# Patient Record
Sex: FEMALE | Race: BLACK OR AFRICAN AMERICAN | NOT HISPANIC OR LATINO | Employment: FULL TIME | URBAN - METROPOLITAN AREA
[De-identification: names, ages, dates, MRNs, and addresses within clinical notes are randomized per-mention and may not be internally consistent; named-entity substitution may affect disease eponyms.]

---

## 2023-06-09 DIAGNOSIS — I10 HYPERTENSION, UNSPECIFIED TYPE: ICD-10-CM

## 2023-06-09 RX ORDER — VALSARTAN AND HYDROCHLOROTHIAZIDE 160; 12.5 MG/1; MG/1
1 TABLET, FILM COATED ORAL DAILY
Qty: 90 TABLET | Refills: 0 | Status: SHIPPED | OUTPATIENT
Start: 2023-06-09 | End: 2024-03-20 | Stop reason: SDUPTHER

## 2023-06-09 RX ORDER — VALSARTAN AND HYDROCHLOROTHIAZIDE 160; 12.5 MG/1; MG/1
TABLET, FILM COATED ORAL
COMMUNITY
End: 2023-06-09 | Stop reason: SDUPTHER

## 2023-07-11 PROBLEM — R79.9 ABNORMAL BLOOD FINDINGS: Status: ACTIVE | Noted: 2023-07-11

## 2023-07-11 PROBLEM — L65.9 HAIR LOSS: Status: ACTIVE | Noted: 2023-07-11

## 2023-07-11 PROBLEM — I10 BENIGN HYPERTENSION: Status: ACTIVE | Noted: 2023-07-11

## 2023-07-11 PROBLEM — L71.9 ROSACEA: Status: ACTIVE | Noted: 2023-07-11

## 2023-07-11 PROBLEM — N62 BREAST HYPERTROPHY: Status: ACTIVE | Noted: 2023-07-11

## 2023-07-11 PROBLEM — M77.11 LATERAL EPICONDYLITIS OF RIGHT ELBOW: Status: ACTIVE | Noted: 2023-07-11

## 2023-07-11 PROBLEM — E53.8 VITAMIN B 12 DEFICIENCY: Status: ACTIVE | Noted: 2023-07-11

## 2023-07-11 PROBLEM — I88.9 LYMPHADENITIS: Status: ACTIVE | Noted: 2023-07-11

## 2023-07-11 PROBLEM — L70.9 ACNE: Status: ACTIVE | Noted: 2023-07-11

## 2023-07-11 PROBLEM — E55.9 VITAMIN D DEFICIENCY DISEASE: Status: ACTIVE | Noted: 2023-07-11

## 2023-07-11 PROBLEM — K21.9 ESOPHAGEAL REFLUX: Status: ACTIVE | Noted: 2023-07-11

## 2023-07-11 PROBLEM — E66.3 OVERWEIGHT: Status: ACTIVE | Noted: 2023-07-11

## 2023-07-11 RX ORDER — CLINDAMYCIN PHOSPHATE 10 UG/ML
LOTION TOPICAL
COMMUNITY
Start: 2021-04-28

## 2023-07-11 RX ORDER — ERGOCALCIFEROL 1.25 1/1
CAPSULE ORAL
COMMUNITY
End: 2023-08-18 | Stop reason: SDUPTHER

## 2023-07-11 RX ORDER — IBUPROFEN 800 MG/1
TABLET ORAL
COMMUNITY
Start: 2023-01-16

## 2023-07-11 RX ORDER — TRETINOIN 1 MG/G
CREAM TOPICAL
COMMUNITY
Start: 2022-03-31

## 2023-07-11 RX ORDER — MAGNESIUM 200 MG
TABLET ORAL
COMMUNITY
Start: 2021-09-16 | End: 2023-08-18 | Stop reason: SDUPTHER

## 2023-08-18 ENCOUNTER — TELEPHONE (OUTPATIENT)
Dept: PRIMARY CARE | Facility: CLINIC | Age: 48
End: 2023-08-18

## 2023-08-18 ENCOUNTER — OFFICE VISIT (OUTPATIENT)
Dept: PRIMARY CARE | Facility: CLINIC | Age: 48
End: 2023-08-18
Payer: COMMERCIAL

## 2023-08-18 VITALS
HEART RATE: 104 BPM | DIASTOLIC BLOOD PRESSURE: 67 MMHG | HEIGHT: 65 IN | BODY MASS INDEX: 26.66 KG/M2 | SYSTOLIC BLOOD PRESSURE: 133 MMHG | WEIGHT: 160 LBS

## 2023-08-18 DIAGNOSIS — Z12.11 COLON CANCER SCREENING: ICD-10-CM

## 2023-08-18 DIAGNOSIS — E53.8 VITAMIN B12 DEFICIENCY: ICD-10-CM

## 2023-08-18 DIAGNOSIS — E55.9 VITAMIN D DEFICIENCY DISEASE: ICD-10-CM

## 2023-08-18 DIAGNOSIS — M25.532 LEFT WRIST PAIN: Primary | ICD-10-CM

## 2023-08-18 DIAGNOSIS — E55.9 VITAMIN D DEFICIENCY: ICD-10-CM

## 2023-08-18 DIAGNOSIS — L65.9 HAIR LOSS: ICD-10-CM

## 2023-08-18 DIAGNOSIS — E53.8 VITAMIN B 12 DEFICIENCY: ICD-10-CM

## 2023-08-18 DIAGNOSIS — I10 BENIGN HYPERTENSION: ICD-10-CM

## 2023-08-18 DIAGNOSIS — R79.9 ABNORMAL BLOOD FINDINGS: ICD-10-CM

## 2023-08-18 LAB
POC HEMOGLOBIN A1C: 5.7 % (ref 4.2–6.5)
THYROTROPIN (MIU/L) IN SER/PLAS BY DETECTION LIMIT <= 0.05 MIU/L: <0.01 MIU/L (ref 0.44–3.98)
THYROXINE (T4) FREE (NG/DL) IN SER/PLAS: 1.77 NG/DL (ref 0.78–1.48)

## 2023-08-18 PROCEDURE — 83036 HEMOGLOBIN GLYCOSYLATED A1C: CPT | Performed by: FAMILY MEDICINE

## 2023-08-18 PROCEDURE — 1036F TOBACCO NON-USER: CPT | Performed by: FAMILY MEDICINE

## 2023-08-18 PROCEDURE — 3078F DIAST BP <80 MM HG: CPT | Performed by: FAMILY MEDICINE

## 2023-08-18 PROCEDURE — 84443 ASSAY THYROID STIM HORMONE: CPT

## 2023-08-18 PROCEDURE — 3075F SYST BP GE 130 - 139MM HG: CPT | Performed by: FAMILY MEDICINE

## 2023-08-18 PROCEDURE — 99396 PREV VISIT EST AGE 40-64: CPT | Performed by: FAMILY MEDICINE

## 2023-08-18 PROCEDURE — 84439 ASSAY OF FREE THYROXINE: CPT

## 2023-08-18 RX ORDER — MAGNESIUM 200 MG
1 TABLET ORAL DAILY
Qty: 90 TABLET | Refills: 1 | Status: SHIPPED | OUTPATIENT
Start: 2023-08-18 | End: 2023-10-30 | Stop reason: SDUPTHER

## 2023-08-18 RX ORDER — DICLOFENAC SODIUM 10 MG/G
4 GEL TOPICAL 4 TIMES DAILY
Qty: 100 G | Refills: 1 | Status: SHIPPED | OUTPATIENT
Start: 2023-08-18 | End: 2023-10-30 | Stop reason: ALTCHOICE

## 2023-08-18 RX ORDER — ERGOCALCIFEROL 1.25 1/1
CAPSULE ORAL
Qty: 13 CAPSULE | Refills: 1 | Status: SHIPPED | OUTPATIENT
Start: 2023-08-18 | End: 2023-10-30 | Stop reason: ALTCHOICE

## 2023-08-18 ASSESSMENT — ENCOUNTER SYMPTOMS
LOSS OF SENSATION IN FEET: 0
OCCASIONAL FEELINGS OF UNSTEADINESS: 0
DEPRESSION: 0

## 2023-08-18 ASSESSMENT — PATIENT HEALTH QUESTIONNAIRE - PHQ9
1. LITTLE INTEREST OR PLEASURE IN DOING THINGS: NOT AT ALL
2. FEELING DOWN, DEPRESSED OR HOPELESS: NOT AT ALL
SUM OF ALL RESPONSES TO PHQ9 QUESTIONS 1 AND 2: 0

## 2023-08-18 NOTE — PROGRESS NOTES
"  Subjective     Patient ID: Ely Melgar is a 47 y.o. female who presents for Follow-up (3 month A1c check ) and Wrist Pain.      Last Physical :  1 Years ago     Pt's PMH, PSH, SH, FH , meds and allergies was obtained / reviewed and updated .     Dental visits : Y  Vision issues : N  Hearing issues : N    Immunizations : Y    Diet :  could be better  Exercise:  Weight concerns : Y     Alcohol: as noted in SH  Tobacco: as noted in SH  Recreational drug use : None/ as noted in SH    Sexually active : Active   Contraception :   Menstrual problems:  Premenopausal/perimenopausal/ postmenopausal:    G:  Parity:  Full term:    Premature:   (s):   Living :  Ab induced:   Ab spontaneous :  Ectopic :   Multiple :    PAP smear : Due now/  Mammogram :UTD  Colonoscopy:    Metabolic screening   - Lipid   - Glucose    Patient has had pain at the base of the left thumb has noted swelling  ======================================================    Visit Vitals  Blood Pressure 133/67   Pulse 104   Height 1.651 m (5' 5\")   Weight 72.6 kg (160 lb)   Body Mass Index 26.63 kg/m²   Smoking Status Never   Body Surface Area 1.82 m²      No LMP recorded.     =====================================    Review of systems:  Constitutional: no chills, no fever and no night sweats.     Eyes: no blurred vision and no eyesight problems.     ENT: no hearing loss, no nasal congestion, no nasal discharge, no hoarseness and no sore throat.     Cardiovascular: no chest pain, no intermittent leg claudication, no lower extremity edema, no palpitations and no syncope.     Respiratory: no cough, no shortness of breath during exertion, no shortness of breath at rest and no wheezing.     Gastrointestinal: no abdominal pain, no blood in stools, no constipation, no diarrhea, no melena, no nausea, no rectal pain and no vomiting.     Genitourinary: no dysuria, no change in urinary frequency, no urinary hesitancy, no feelings of urinary urgency and no " vaginal discharge.     Musculoskeletal: no arthralgias, no back pain and no myalgias.     Integumentary: no new skin lesions and no rashes.     Neurological: no difficulty walking, no headache, no limb weakness, no numbness and no tingling.     Psychiatric: no anxiety, no depression, no anhedonia and no substance use disorders.   ============================================================    Physical exam :    Constitutional: Alert and in no acute distress. Well developed, well nourished.     Eyes: Normal external exam. Pupils were equal in size, round, reactive to light (PERRL) with normal accommodation and extraocular movements intact (EOMI).     Ears, Nose, Mouth, and Throat: External inspection of ears and nose: Normal.  Otoscopic examination: Normal.      Neck: No neck mass was observed. Supple.     Cardiovascular: Heart rate and rhythm were normal, normal S1 and S2, no gallops, no murmurs and no pericardial rub    Pulmonary: No respiratory distress. Clear bilateral breath sounds.     Abdomen: Soft nontender; no abdominal mass palpated. No organomegaly.     Musculoskeletal: No joint swelling seen, normal movements of all extremities. Range of motion: Normal.  Muscle strength/tone: Normal.      Skin: Normal skin color and pigmentation, normal skin turgor, and no rash.     Neurologic: Deep tendon reflexes were 2+ and symmetric. Sensation: Normal.     Psychiatric: Judgment and insight: Intact. Mood and affect: Normal.    Lymphatic : Cervical/ axillary/ groin Lns Palpable/ non palpable     Endocrine: no recent weight gain and no recent weight loss.     Hematologic/Lymphatic: no tendency for easy bruising and no swollen glands.          All other systems have been reviewed and are negative for complaint.      Assessment/Plan    Problem List Items Addressed This Visit       Abnormal blood findings    Relevant Orders    POCT glycosylated hemoglobin (Hb A1C) manually resulted (Completed)    Benign hypertension      Continue current medication         Hair loss    Relevant Orders    Thyroxine, Free    Thyroid Stimulating Hormone    Vitamin B 12 deficiency    Vitamin D deficiency disease    Colon cancer screening    Relevant Orders    Colonoscopy    Left wrist pain - Primary    Relevant Medications    diclofenac sodium (Voltaren) 1 % gel gel    Other Relevant Orders    XR wrist left 3+ views    Vitamin D deficiency    Relevant Medications    Vitamin D2 1,250 mcg (50,000 unit) capsule    Vitamin B12 deficiency    Relevant Medications    cyanocobalamin, vitamin B-12, 1,000 mcg tablet, sublingual   Follow up soon for PAP  Patient was referred for colonoscopy   Will get x-ray of the wrist

## 2023-09-08 ENCOUNTER — APPOINTMENT (OUTPATIENT)
Dept: PRIMARY CARE | Facility: CLINIC | Age: 48
End: 2023-09-08
Payer: COMMERCIAL

## 2023-09-25 ENCOUNTER — PROCEDURE VISIT (OUTPATIENT)
Dept: PRIMARY CARE | Facility: CLINIC | Age: 48
End: 2023-09-25
Payer: COMMERCIAL

## 2023-09-25 VITALS
BODY MASS INDEX: 26.52 KG/M2 | DIASTOLIC BLOOD PRESSURE: 91 MMHG | SYSTOLIC BLOOD PRESSURE: 133 MMHG | HEART RATE: 108 BPM | HEIGHT: 66 IN | WEIGHT: 165 LBS

## 2023-09-25 DIAGNOSIS — I10 BENIGN HYPERTENSION: ICD-10-CM

## 2023-09-25 DIAGNOSIS — Z12.4 CERVICAL CANCER SCREENING: Primary | ICD-10-CM

## 2023-09-25 DIAGNOSIS — E05.90 HYPERTHYROIDISM: ICD-10-CM

## 2023-09-25 PROCEDURE — 84439 ASSAY OF FREE THYROXINE: CPT

## 2023-09-25 PROCEDURE — 99214 OFFICE O/P EST MOD 30 MIN: CPT | Performed by: FAMILY MEDICINE

## 2023-09-25 PROCEDURE — 88175 CYTOPATH C/V AUTO FLUID REDO: CPT

## 2023-09-25 PROCEDURE — 87624 HPV HI-RISK TYP POOLED RSLT: CPT

## 2023-09-25 ASSESSMENT — ENCOUNTER SYMPTOMS
LOSS OF SENSATION IN FEET: 0
DEPRESSION: 0
OCCASIONAL FEELINGS OF UNSTEADINESS: 0

## 2023-09-25 ASSESSMENT — PATIENT HEALTH QUESTIONNAIRE - PHQ9
1. LITTLE INTEREST OR PLEASURE IN DOING THINGS: NOT AT ALL
SUM OF ALL RESPONSES TO PHQ9 QUESTIONS 1 AND 2: 0
2. FEELING DOWN, DEPRESSED OR HOPELESS: NOT AT ALL

## 2023-09-26 LAB — THYROXINE (T4) FREE (NG/DL) IN SER/PLAS: 1.28 NG/DL (ref 0.78–1.48)

## 2023-09-27 ENCOUNTER — LAB (OUTPATIENT)
Dept: LAB | Facility: LAB | Age: 48
End: 2023-09-27
Payer: COMMERCIAL

## 2023-09-27 DIAGNOSIS — E05.90 OVERACTIVE THYROID GLAND: ICD-10-CM

## 2023-09-27 LAB — THYROTROPIN (MIU/L) IN SER/PLAS BY DETECTION LIMIT <= 0.05 MIU/L: <0.01 MIU/L (ref 0.44–3.98)

## 2023-09-27 PROCEDURE — 36415 COLL VENOUS BLD VENIPUNCTURE: CPT

## 2023-09-27 PROCEDURE — 83519 RIA NONANTIBODY: CPT

## 2023-09-27 PROCEDURE — 84443 ASSAY THYROID STIM HORMONE: CPT

## 2023-09-27 PROCEDURE — 84445 ASSAY OF TSI GLOBULIN: CPT

## 2023-09-29 LAB — TSH RECEPTOR ANTIBODY: 11.9 IU/L

## 2023-10-01 PROBLEM — E05.90 HYPERTHYROIDISM: Status: ACTIVE | Noted: 2023-10-01

## 2023-10-01 PROBLEM — Z12.4 CERVICAL CANCER SCREENING: Status: ACTIVE | Noted: 2023-10-01

## 2023-10-01 ASSESSMENT — ENCOUNTER SYMPTOMS
NAUSEA: 0
APPETITE CHANGE: 1
HEMATOLOGIC/LYMPHATIC NEGATIVE: 1
UNEXPECTED WEIGHT CHANGE: 1
ALLERGIC/IMMUNOLOGIC NEGATIVE: 1
CHILLS: 0
ENDOCRINE NEGATIVE: 1
PSYCHIATRIC NEGATIVE: 1
GASTROINTESTINAL NEGATIVE: 1
VOMITING: 0
MUSCULOSKELETAL NEGATIVE: 1
CARDIOVASCULAR NEGATIVE: 1
FEVER: 0
NEUROLOGICAL NEGATIVE: 1
RESPIRATORY NEGATIVE: 1

## 2023-10-02 DIAGNOSIS — E05.90 HYPERTHYROIDISM: ICD-10-CM

## 2023-10-02 RX ORDER — METHIMAZOLE 5 MG/1
5 TABLET ORAL 2 TIMES DAILY
Qty: 180 TABLET | Refills: 1 | Status: SHIPPED | OUTPATIENT
Start: 2023-10-02 | End: 2023-10-27 | Stop reason: SDUPTHER

## 2023-10-02 NOTE — PROGRESS NOTES
Subjective   Patient ID: Ely Melgar is a 48 y.o. female who presents for Follow-up and Gynecologic Exam.      Gynecologic Exam  Pertinent negatives include no chills, fever, nausea, rash or vomiting.    hypothyroidism TSH was low T4 was high was advised to return to check antibodies but is advised to start methimazole  Current Outpatient Medications on File Prior to Visit   Medication Sig Dispense Refill    clindamycin (Cleocin T) 1 % lotion APPLY SPARINGLY TO AFFECTED AREA(S) ONCE DAILY      cyanocobalamin, vitamin B-12, 1,000 mcg tablet, sublingual Take 1 tablet (1,000 mcg) by mouth once daily. 90 tablet 1    valsartan-hydrochlorothiazide (Diovan-HCT) 160-12.5 mg tablet Take 1 tablet by mouth once daily. 90 tablet 0    Vitamin D2 1,250 mcg (50,000 unit) capsule TAKE ONE CAPSULE BY MOUTH EVERY WEEK 13 capsule 1    diclofenac sodium (Voltaren) 1 % gel gel Apply 1 Application topically 4 times a day. (Patient not taking: Reported on 9/25/2023) 100 g 1     mg tablet TAKE ONE TABLET BY MOUTH EVERY 8 HOURS AS NEEDED FOR PAIN      methIMAzole (Tapazole) 5 mg tablet Take 1 tablet (5 mg) by mouth once daily. (Patient not taking: Reported on 9/25/2023) 90 tablet 1    tretinoin (Retin-A) 0.1 % cream Apply sparingly to affected area once daily at bedtime       No current facility-administered medications on file prior to visit.        Review of Systems   Constitutional:  Positive for appetite change and unexpected weight change. Negative for chills and fever.   HENT: Negative.     Respiratory: Negative.     Cardiovascular: Negative.    Gastrointestinal: Negative.  Negative for nausea and vomiting.   Endocrine: Negative.    Genitourinary: Negative.    Musculoskeletal: Negative.    Skin: Negative.  Negative for rash.   Allergic/Immunologic: Negative.    Neurological: Negative.    Hematological: Negative.    Psychiatric/Behavioral: Negative.     All other systems reviewed and are negative.      Objective   Blood  "Pressure (Abnormal) 133/91   Pulse 108   Height 1.676 m (5' 6\")   Weight 74.8 kg (165 lb)   Body Mass Index 26.63 kg/m²   BSA: 1.87 meters squared  Growth percentiles: Facility age limit for growth %jami is 20 years. Facility age limit for growth %jami is 20 years.   Procedure Visit on 09/25/2023   Component Date Value Ref Range Status    Free T4 09/25/2023 1.28  0.78 - 1.48 ng/dL Final     Thyroxine Free testing is performed using different testing    methodology at [facility] than at other    [facility]. Direct result comparisons should    only be made within the same method.      Physical Exam  Constitutional:       General: She is not in acute distress.  HENT:      Head: Normocephalic.   Eyes:      Extraocular Movements: Extraocular movements intact.   Cardiovascular:      Rate and Rhythm: Normal rate and regular rhythm.   Pulmonary:      Breath sounds: Normal breath sounds.   Abdominal:      General: Bowel sounds are normal.   Musculoskeletal:         General: Normal range of motion.      Cervical back: No rigidity.   Skin:     Findings: No rash.   Neurological:      General: No focal deficit present.      Mental Status: She is alert.   Psychiatric:         Thought Content: Thought content normal.         Assessment/Plan   Problem List Items Addressed This Visit             ICD-10-CM    Benign hypertension I10     Continue current medication         Cervical cancer screening - Primary Z12.4    Relevant Orders    THINPREP PAP TEST    Hyperthyroidism E05.90     We will check labs patient was advised to restart methimazole         Relevant Orders    Thyroxine, Free (Completed)              "

## 2023-10-03 LAB
COMPLETE PATHOLOGY REPORT: NORMAL
CONVERTED CLINICAL DIAGNOSIS-HISTORY: NORMAL
CONVERTED DIAGNOSIS COMMENT: NORMAL
CONVERTED FINAL DIAGNOSIS: NORMAL
CONVERTED FINAL REPORT PDF LINK TO COPY AND PASTE: NORMAL

## 2023-10-04 ENCOUNTER — TELEPHONE (OUTPATIENT)
Dept: PRIMARY CARE | Facility: CLINIC | Age: 48
End: 2023-10-04
Payer: COMMERCIAL

## 2023-10-09 ENCOUNTER — OFFICE VISIT (OUTPATIENT)
Dept: SURGERY | Facility: CLINIC | Age: 48
End: 2023-10-09
Payer: COMMERCIAL

## 2023-10-09 VITALS
HEART RATE: 92 BPM | SYSTOLIC BLOOD PRESSURE: 131 MMHG | WEIGHT: 166 LBS | BODY MASS INDEX: 26.79 KG/M2 | DIASTOLIC BLOOD PRESSURE: 52 MMHG

## 2023-10-09 DIAGNOSIS — E04.2 MULTINODULAR THYROID: Primary | ICD-10-CM

## 2023-10-09 DIAGNOSIS — E05.00 GRAVES DISEASE: ICD-10-CM

## 2023-10-09 PROCEDURE — 3075F SYST BP GE 130 - 139MM HG: CPT | Performed by: SURGERY

## 2023-10-09 PROCEDURE — 88112 CYTOPATH CELL ENHANCE TECH: CPT

## 2023-10-09 PROCEDURE — 99202 OFFICE O/P NEW SF 15 MIN: CPT | Performed by: SURGERY

## 2023-10-09 PROCEDURE — 60100 BIOPSY OF THYROID: CPT | Performed by: SURGERY

## 2023-10-09 PROCEDURE — 88173 CYTOPATH EVAL FNA REPORT: CPT

## 2023-10-09 PROCEDURE — 88173 CYTOPATH EVAL FNA REPORT: CPT | Performed by: PATHOLOGY

## 2023-10-09 PROCEDURE — 3078F DIAST BP <80 MM HG: CPT | Performed by: SURGERY

## 2023-10-09 PROCEDURE — 1036F TOBACCO NON-USER: CPT | Performed by: SURGERY

## 2023-10-09 ASSESSMENT — ENCOUNTER SYMPTOMS
GASTROINTESTINAL NEGATIVE: 1
MUSCULOSKELETAL NEGATIVE: 1
PSYCHIATRIC NEGATIVE: 1
HEMATOLOGIC/LYMPHATIC NEGATIVE: 1
EYES NEGATIVE: 1
RESPIRATORY NEGATIVE: 1
ENDOCRINE NEGATIVE: 1
NEUROLOGICAL NEGATIVE: 1
ALLERGIC/IMMUNOLOGIC NEGATIVE: 1
PALPITATIONS: 1
FATIGUE: 1

## 2023-10-09 NOTE — PROGRESS NOTES
Subjective   Patient ID: Ely eMlgar is a 48 y.o. female who presents for No chief complaint on file..  HPI I saw Mrs. Melgar in surgery clinic today.  She was referred by her primary care physician for evaluation of a multinodular thyroid gland in the background setting of Graves' disease.  She reports that she was diagnosed with Graves' disease about a month or so ago.  She is currently taking methimazole.  At the time of her diagnosis her TSH was completely suppressed at less than 0.01.  Her free T4 was high at 1.77.  Her thyrotropin receptor antibody was high at 11.9 giving her diagnosis of Graves'.    She denies any neck pain.  No real difficulty with breathing or swallowing.  She does occasionally get a globus sensation in her throat.  No hoarseness.  No personal history of head neck or chest radiation exposure.    Family history she said some in the family had some sort of thyroid disorder.  Unclear what.  No history of thyroid cancer in the family.    Review of Systems   Constitutional:  Positive for fatigue.   HENT: Negative.     Eyes: Negative.    Respiratory: Negative.     Cardiovascular:  Positive for palpitations.   Gastrointestinal: Negative.    Endocrine: Negative.    Musculoskeletal: Negative.    Skin: Negative.    Allergic/Immunologic: Negative.    Neurological: Negative.    Hematological: Negative.    Psychiatric/Behavioral: Negative.         Objective   Physical Exam  Constitutional:       Appearance: Normal appearance.   Eyes:      Pupils: Pupils are equal, round, and reactive to light.      Comments: No proptosis   Neck:      Vascular: No carotid bruit.      Comments: Patient has a palpable multinodular thyroid gland with right greater than left-sided nodules.  Her trachea is midline.  Nodules move easily upon swallowing.  No localized fixation.  Cardiovascular:      Pulses: Normal pulses.      Heart sounds: Normal heart sounds.   Pulmonary:      Effort: Pulmonary effort is normal.       Breath sounds: Normal breath sounds.   Abdominal:      General: Abdomen is flat.   Musculoskeletal:         General: Normal range of motion.      Cervical back: No rigidity.   Skin:     General: Skin is warm.      Coloration: Skin is not jaundiced.      Findings: No lesion.   Neurological:      General: No focal deficit present.      Mental Status: She is alert and oriented to person, place, and time.   Psychiatric:         Mood and Affect: Mood normal.         Behavior: Behavior normal.     Lab Results   Component Value Date    TSH <0.01 (L) 09/27/2023    FREET4 1.28 09/25/2023       Patient ID: Ely Melgar is a 48 y.o. female.    General    Date/Time: 10/9/2023 10:47 AM    Performed by: Kaleb Cade MD  Authorized by: Kaleb Cade MD    Consent:     Consent obtained:  Verbal    Consent given by:  Patient    Risks, benefits, and alternatives were discussed: yes      Risks discussed:  Bleeding and infection    Alternatives discussed:  No treatment  Universal protocol:     Procedure explained and questions answered to patient or proxy's satisfaction: yes      Relevant documents present and verified: yes      Test results available: yes      Imaging studies available: yes      Required blood products, implants, devices, and special equipment available: no      Site/side marked: no      Immediately prior to procedure, a time out was called: yes      Patient identity confirmed:  Verbally with patient  Indications:     Indications:  Thyroid nodule  Pre-procedure details:     Skin preparation:  Povidone-iodine  Sedation:     Sedation type:  None (local anesthesia lidocaine1%)  Anesthesia:     Anesthesia method:  Local infiltration    Local anesthetic:  Lidocaine 1% w/o epi  Procedure specific details:      In the office I did an ultrasound-guided biopsy of the patient's 2 cm right-sided TI-RADS 4 nodule.  This was done under real-time ultrasound guidance at the 6-15 MHz linear ultrasound probe.  2 samples  were taken.  Images were captured.  Post-procedure details:     Procedure completion:  Tolerated        Assessment/Plan     Mrs. Melgar has a new diagnosis of Graves' disease.  She is currently on methimazole under the care of her primary care physician for treatment of that.  I will leave that to her PCPs discretion and adjustment of medications.    However patient also had an ultrasound performed in August showing multinodular thyroid gland.  There was a dominant 2 cm nodule in the right thyroid lobe that was TI-RADS 4.  Based on this characteristic I told her I would recommend a biopsy of the lesion.  There was a second lesion at 1.8 cm.  On my personal review that looks like 2 separate nodules.  Therefore do not think either nodule would meet criteria for biopsy out of that conglomerate.    Today in the office I did an ultrasound-guided biopsy of the lesion on the right side that was 2 cm.  She tolerated this well with no complications.    Plan    1.  I told her I will call her toward the end of the week with biopsy reports.  If she has not heard back from me within 1 week, she can feel free to contact my office.  Any further decision for surgical excision versus ongoing radiographic evaluation will be based on biopsy reports.

## 2023-10-10 ENCOUNTER — APPOINTMENT (OUTPATIENT)
Dept: GASTROENTEROLOGY | Facility: HOSPITAL | Age: 48
End: 2023-10-10
Payer: COMMERCIAL

## 2023-10-10 DIAGNOSIS — E05.90 HYPERTHYROIDISM: ICD-10-CM

## 2023-10-10 LAB
LABORATORY COMMENT REPORT: NORMAL
LABORATORY COMMENT REPORT: NORMAL
PATH REPORT.FINAL DX SPEC: NORMAL
PATH REPORT.GROSS SPEC: NORMAL
PATH REPORT.RELEVANT HX SPEC: NORMAL
PATH REPORT.TOTAL CANCER: NORMAL
THYROID STIMULATING IMMUNOGLOBULIN: 3.9 TSI INDEX

## 2023-10-16 ENCOUNTER — DOCUMENTATION (OUTPATIENT)
Dept: SURGERY | Facility: CLINIC | Age: 48
End: 2023-10-16
Payer: COMMERCIAL

## 2023-10-16 NOTE — PROGRESS NOTES
Sent patient thyroid ultrasound order requisition (due 10/2024) via email with instructions to schedule.

## 2023-10-27 DIAGNOSIS — E05.90 HYPERTHYROIDISM: ICD-10-CM

## 2023-10-27 RX ORDER — METHIMAZOLE 5 MG/1
5 TABLET ORAL 2 TIMES DAILY
Qty: 180 TABLET | Refills: 0 | Status: SHIPPED | OUTPATIENT
Start: 2023-10-27 | End: 2023-10-31 | Stop reason: ALTCHOICE

## 2023-10-30 ENCOUNTER — OFFICE VISIT (OUTPATIENT)
Dept: PRIMARY CARE | Facility: CLINIC | Age: 48
End: 2023-10-30
Payer: COMMERCIAL

## 2023-10-30 VITALS
HEIGHT: 65 IN | SYSTOLIC BLOOD PRESSURE: 127 MMHG | WEIGHT: 156 LBS | DIASTOLIC BLOOD PRESSURE: 85 MMHG | BODY MASS INDEX: 25.99 KG/M2

## 2023-10-30 DIAGNOSIS — E53.8 VITAMIN B12 DEFICIENCY: ICD-10-CM

## 2023-10-30 DIAGNOSIS — E55.9 VITAMIN D DEFICIENCY: ICD-10-CM

## 2023-10-30 DIAGNOSIS — E05.90 HYPERTHYROIDISM: ICD-10-CM

## 2023-10-30 DIAGNOSIS — E05.00 GRAVES DISEASE: Primary | ICD-10-CM

## 2023-10-30 LAB
ALBUMIN SERPL BCP-MCNC: 4.1 G/DL (ref 3.4–5)
ALP SERPL-CCNC: 152 U/L (ref 33–110)
ALT SERPL W P-5'-P-CCNC: 24 U/L (ref 7–45)
ANION GAP SERPL CALC-SCNC: 15 MMOL/L (ref 10–20)
AST SERPL W P-5'-P-CCNC: 22 U/L (ref 9–39)
BILIRUB SERPL-MCNC: 0.4 MG/DL (ref 0–1.2)
BUN SERPL-MCNC: 16 MG/DL (ref 6–23)
CALCIUM SERPL-MCNC: 9.4 MG/DL (ref 8.6–10.6)
CHLORIDE SERPL-SCNC: 107 MMOL/L (ref 98–107)
CO2 SERPL-SCNC: 25 MMOL/L (ref 21–32)
CREAT SERPL-MCNC: 0.63 MG/DL (ref 0.5–1.05)
ERYTHROCYTE [DISTWIDTH] IN BLOOD BY AUTOMATED COUNT: 12.8 % (ref 11.5–14.5)
GFR SERPL CREATININE-BSD FRML MDRD: >90 ML/MIN/1.73M*2
GLUCOSE SERPL-MCNC: 77 MG/DL (ref 74–99)
HCT VFR BLD AUTO: 40.9 % (ref 36–46)
HGB BLD-MCNC: 13.3 G/DL (ref 12–16)
MCH RBC QN AUTO: 30.2 PG (ref 26–34)
MCHC RBC AUTO-ENTMCNC: 32.5 G/DL (ref 32–36)
MCV RBC AUTO: 93 FL (ref 80–100)
NRBC BLD-RTO: 0 /100 WBCS (ref 0–0)
PLATELET # BLD AUTO: 357 X10*3/UL (ref 150–450)
PMV BLD AUTO: 9.2 FL (ref 7.5–11.5)
POTASSIUM SERPL-SCNC: 4.4 MMOL/L (ref 3.5–5.3)
PROT SERPL-MCNC: 7.1 G/DL (ref 6.4–8.2)
RBC # BLD AUTO: 4.4 X10*6/UL (ref 4–5.2)
SODIUM SERPL-SCNC: 143 MMOL/L (ref 136–145)
WBC # BLD AUTO: 4 X10*3/UL (ref 4.4–11.3)

## 2023-10-30 PROCEDURE — 36415 COLL VENOUS BLD VENIPUNCTURE: CPT

## 2023-10-30 PROCEDURE — 85027 COMPLETE CBC AUTOMATED: CPT

## 2023-10-30 PROCEDURE — 84439 ASSAY OF FREE THYROXINE: CPT

## 2023-10-30 PROCEDURE — 3079F DIAST BP 80-89 MM HG: CPT | Performed by: FAMILY MEDICINE

## 2023-10-30 PROCEDURE — 3074F SYST BP LT 130 MM HG: CPT | Performed by: FAMILY MEDICINE

## 2023-10-30 PROCEDURE — 99214 OFFICE O/P EST MOD 30 MIN: CPT | Performed by: FAMILY MEDICINE

## 2023-10-30 PROCEDURE — 84443 ASSAY THYROID STIM HORMONE: CPT

## 2023-10-30 PROCEDURE — 1036F TOBACCO NON-USER: CPT | Performed by: FAMILY MEDICINE

## 2023-10-30 PROCEDURE — 80053 COMPREHEN METABOLIC PANEL: CPT

## 2023-10-30 RX ORDER — MAGNESIUM 200 MG
1 TABLET ORAL DAILY
Qty: 90 TABLET | Refills: 1 | Status: SHIPPED | OUTPATIENT
Start: 2023-10-30

## 2023-10-30 RX ORDER — ERGOCALCIFEROL 1.25 MG/1
CAPSULE ORAL
Qty: 13 CAPSULE | Refills: 1 | Status: SHIPPED | OUTPATIENT
Start: 2023-10-30

## 2023-10-30 ASSESSMENT — ENCOUNTER SYMPTOMS
OCCASIONAL FEELINGS OF UNSTEADINESS: 0
DEPRESSION: 0
LOSS OF SENSATION IN FEET: 0

## 2023-10-30 ASSESSMENT — PATIENT HEALTH QUESTIONNAIRE - PHQ9
SUM OF ALL RESPONSES TO PHQ9 QUESTIONS 1 AND 2: 0
2. FEELING DOWN, DEPRESSED OR HOPELESS: NOT AT ALL
1. LITTLE INTEREST OR PLEASURE IN DOING THINGS: NOT AT ALL

## 2023-10-31 LAB
T4 FREE SERPL-MCNC: 0.98 NG/DL (ref 0.78–1.48)
TSH SERPL-ACNC: <0.01 MIU/L (ref 0.44–3.98)

## 2023-11-05 PROBLEM — E05.00 GRAVES DISEASE: Status: ACTIVE | Noted: 2023-11-05

## 2023-11-05 RX ORDER — METHIMAZOLE 10 MG/1
10 TABLET ORAL 2 TIMES DAILY
Qty: 60 TABLET | Refills: 2 | Status: SHIPPED | OUTPATIENT
Start: 2023-11-05 | End: 2024-03-12 | Stop reason: ALTCHOICE

## 2023-11-05 NOTE — PROGRESS NOTES
Chief Complaint/HPI:    Patient is here for follow-up for hyperparathyroidism hypertension blood pressure improved weight is unchanged endocrinology appointment not till the end of December    ROS otherwise negative aside from what was mentioned above in HPI.      Patient Active Problem List   Diagnosis    Abnormal blood findings    Acne    Benign hypertension    Breast hypertrophy    Esophageal reflux    Hair loss    Lateral epicondylitis of right elbow    Lymphadenitis    Overweight    Rosacea    Vitamin B 12 deficiency    Vitamin D deficiency disease    Colon cancer screening    Left wrist pain    Vitamin D deficiency    Vitamin B12 deficiency    Cervical cancer screening    Hyperthyroidism    Graves disease     Past Medical History:   Diagnosis Date    HTN (hypertension)     Thyroid nodule     Unspecified abdominal hernia without obstruction or gangrene     Hernia     No past surgical history on file.  Family History   Problem Relation Name Age of Onset    Hypertension Father       Social History     Tobacco Use    Smoking status: Never    Smokeless tobacco: Never   Substance Use Topics    Alcohol use: Yes     Comment: social drinker    Drug use: Never         ALLERGIES  No Known Allergies      MEDICATIONS  Current Outpatient Medications   Medication Sig Dispense Refill    clindamycin (Cleocin T) 1 % lotion APPLY SPARINGLY TO AFFECTED AREA(S) ONCE DAILY      cyanocobalamin, vitamin B-12, 1,000 mcg tablet, sublingual Take 1 tablet (1,000 mcg) by mouth once daily. 90 tablet 1    ergocalciferol (Vitamin D2) 1.25 MG (04424 UT) capsule TAKE ONE CAPSULE BY MOUTH EVERY WEEK 13 capsule 1     mg tablet TAKE ONE TABLET BY MOUTH EVERY 8 HOURS AS NEEDED FOR PAIN      methIMAzole (Tapazole) 10 mg tablet Take 1 tablet (10 mg) by mouth 2 times a day. 60 tablet 2    tretinoin (Retin-A) 0.1 % cream Apply sparingly to affected area once daily at bedtime      valsartan-hydrochlorothiazide (Diovan-HCT) 160-12.5 mg tablet  Take 1 tablet by mouth once daily. 90 tablet 0     No current facility-administered medications for this visit.         PHYSICAL EXAM  Visit Vitals  Blood Pressure 127/85     Body mass index is 25.96 kg/m².  Gen: Alert, NAD  HEENT:  PERRLA, EOMI, conjunctiva and sclera normal in appearance  Respiratory:  Lungs CTAB  Cardiovascular:  Heart RRR. No M/R/G  Neuro:  Gross motor and sensory intact  Skin:  No suspicious lesions present    ASSESSMENT/PLAN  Problem List Items Addressed This Visit       Vitamin D deficiency    Relevant Medications    ergocalciferol (Vitamin D2) 1.25 MG (38348 UT) capsule    Vitamin B12 deficiency    Relevant Medications    cyanocobalamin, vitamin B-12, 1,000 mcg tablet, sublingual    Hyperthyroidism    Relevant Medications    methIMAzole (Tapazole) 10 mg tablet    Other Relevant Orders    TSH with reflex to Free T4 if abnormal    Graves disease - Primary    Relevant Orders    Thyroxine, Free (Completed)    Thyroid Stimulating Hormone (Completed)    Comprehensive Metabolic Panel (Completed)    CBC (Completed)    TSH with reflex to Free T4 if abnormal           Chadwick Munroe MD

## 2023-12-22 ENCOUNTER — APPOINTMENT (OUTPATIENT)
Dept: ENDOCRINOLOGY | Facility: HOSPITAL | Age: 48
End: 2023-12-22
Payer: COMMERCIAL

## 2024-02-19 ENCOUNTER — TELEPHONE (OUTPATIENT)
Dept: ENDOCRINOLOGY | Facility: HOSPITAL | Age: 49
End: 2024-02-19

## 2024-02-19 ENCOUNTER — OFFICE VISIT (OUTPATIENT)
Dept: ENDOCRINOLOGY | Facility: HOSPITAL | Age: 49
End: 2024-02-19
Payer: COMMERCIAL

## 2024-02-19 ENCOUNTER — LAB (OUTPATIENT)
Dept: LAB | Facility: LAB | Age: 49
End: 2024-02-19
Payer: COMMERCIAL

## 2024-02-19 VITALS
HEIGHT: 66 IN | HEART RATE: 74 BPM | BODY MASS INDEX: 27.8 KG/M2 | TEMPERATURE: 98.1 F | SYSTOLIC BLOOD PRESSURE: 137 MMHG | WEIGHT: 173 LBS | OXYGEN SATURATION: 99 % | DIASTOLIC BLOOD PRESSURE: 92 MMHG

## 2024-02-19 DIAGNOSIS — E05.00 GRAVES DISEASE: ICD-10-CM

## 2024-02-19 DIAGNOSIS — E05.90 HYPERTHYROIDISM: ICD-10-CM

## 2024-02-19 DIAGNOSIS — E05.90 HYPERTHYROIDISM: Primary | ICD-10-CM

## 2024-02-19 LAB
ALBUMIN SERPL BCP-MCNC: 4.4 G/DL (ref 3.4–5)
ALP SERPL-CCNC: 124 U/L (ref 33–110)
ALT SERPL W P-5'-P-CCNC: 16 U/L (ref 7–45)
ANION GAP SERPL CALC-SCNC: 13 MMOL/L (ref 10–20)
AST SERPL W P-5'-P-CCNC: 20 U/L (ref 9–39)
BILIRUB SERPL-MCNC: 0.6 MG/DL (ref 0–1.2)
BUN SERPL-MCNC: 11 MG/DL (ref 6–23)
CALCIUM SERPL-MCNC: 9.5 MG/DL (ref 8.6–10.6)
CHLORIDE SERPL-SCNC: 104 MMOL/L (ref 98–107)
CO2 SERPL-SCNC: 27 MMOL/L (ref 21–32)
CREAT SERPL-MCNC: 0.93 MG/DL (ref 0.5–1.05)
EGFRCR SERPLBLD CKD-EPI 2021: 76 ML/MIN/1.73M*2
GLUCOSE SERPL-MCNC: 87 MG/DL (ref 74–99)
POTASSIUM SERPL-SCNC: 4 MMOL/L (ref 3.5–5.3)
PROT SERPL-MCNC: 7.2 G/DL (ref 6.4–8.2)
SODIUM SERPL-SCNC: 140 MMOL/L (ref 136–145)
T3 SERPL-MCNC: 45 NG/DL (ref 60–200)
T4 FREE SERPL-MCNC: 0.2 NG/DL (ref 0.78–1.48)
TSH SERPL-ACNC: 58.49 MIU/L (ref 0.44–3.98)

## 2024-02-19 PROCEDURE — 1036F TOBACCO NON-USER: CPT | Performed by: STUDENT IN AN ORGANIZED HEALTH CARE EDUCATION/TRAINING PROGRAM

## 2024-02-19 PROCEDURE — 3080F DIAST BP >= 90 MM HG: CPT | Performed by: STUDENT IN AN ORGANIZED HEALTH CARE EDUCATION/TRAINING PROGRAM

## 2024-02-19 PROCEDURE — 36415 COLL VENOUS BLD VENIPUNCTURE: CPT

## 2024-02-19 PROCEDURE — 84443 ASSAY THYROID STIM HORMONE: CPT

## 2024-02-19 PROCEDURE — 99204 OFFICE O/P NEW MOD 45 MIN: CPT | Performed by: STUDENT IN AN ORGANIZED HEALTH CARE EDUCATION/TRAINING PROGRAM

## 2024-02-19 PROCEDURE — 84439 ASSAY OF FREE THYROXINE: CPT

## 2024-02-19 PROCEDURE — 99214 OFFICE O/P EST MOD 30 MIN: CPT | Performed by: STUDENT IN AN ORGANIZED HEALTH CARE EDUCATION/TRAINING PROGRAM

## 2024-02-19 PROCEDURE — 84480 ASSAY TRIIODOTHYRONINE (T3): CPT

## 2024-02-19 PROCEDURE — 84445 ASSAY OF TSI GLOBULIN: CPT

## 2024-02-19 PROCEDURE — 3075F SYST BP GE 130 - 139MM HG: CPT | Performed by: STUDENT IN AN ORGANIZED HEALTH CARE EDUCATION/TRAINING PROGRAM

## 2024-02-19 PROCEDURE — 80053 COMPREHEN METABOLIC PANEL: CPT

## 2024-02-19 ASSESSMENT — PATIENT HEALTH QUESTIONNAIRE - PHQ9
2. FEELING DOWN, DEPRESSED OR HOPELESS: NOT AT ALL
1. LITTLE INTEREST OR PLEASURE IN DOING THINGS: NOT AT ALL
SUM OF ALL RESPONSES TO PHQ9 QUESTIONS 1 AND 2: 0

## 2024-02-19 ASSESSMENT — ENCOUNTER SYMPTOMS
OCCASIONAL FEELINGS OF UNSTEADINESS: 0
LOSS OF SENSATION IN FEET: 0
DEPRESSION: 0

## 2024-02-19 ASSESSMENT — PAIN SCALES - GENERAL: PAINLEVEL: 0-NO PAIN

## 2024-02-19 NOTE — LETTER
February 27, 2024     Chadwick Munroe MD  [address]  [facility] [address]  [facility] [address]    Patient: Ely Melgar   YOB: 1975   Date of Visit: 2/19/2024       Dear Dr. Chadwick Munroe MD:    Thank you for referring Ely Melgar to me for evaluation. Below are my notes for this consultation.  If you have questions, please do not hesitate to call me. I look forward to following your patient along with you.       Sincerely,     Radha Trammell MD      CC: No Recipients  ______________________________________________________________________________________    Subjective   Ely Melgar is a 48 y.o. female who I was asked to see in consultation for evaluation of hyperthyroidism.    Patient diagnosed with Graves diasease around August and was started on methimazole 10 mg twice daily  Initial blood work showed a TSH 0.03 in January 2023 with Ft4: 1.02  Repeated in August 2023 TSH < 0.01 and FT4: 1.77  US thyroid in August:   Right-side:   2 cm solid, hypoechoic nodule the right thyroid midpole compatible a TIRADS 4 nodule.  1.8 cm solid, hypoechoic nodule the right thyroid lower pole compatible a TIRADS 4 nodule.  0.8 cm solid, hypoechoic nodule the right thyroid upper pole compatible a TIRADS 4 nodule.     Left side:   2 separate 1 cm upper pole and lower pole solid, hypoechoic nodules compatible with TI-RADS 4 nodules each.  0.7 cm upper pole nodule which also appears solid and hypoechoic compatible a TIRADS 4 nodule.  Repeat blood work in Sept TSH < 0.01  and FT4: 1.28 continued on methimazole 10 mg BID. TSI 3.9 and TRAB 11.9  Saw Dr. Robledo and thyroid nodule bx was done and was benign    Energy: Good.   Sleep: Was having insomnia and still have some. Goes to bed at 3 am till 10:45. Sometimes feels non rested.   Cold or heat intolerance:- Heat intolerance improved a little bit  Weight: Initially lost weight and now gained it back  Menses: Haven't had one for 2 years  Hot flashes: Yes not everyday around  "3 times per week  Night sweats: Sometimes was worse during summer  BM: No change  Tremors: No   Palpitations: More before and now improved   skin or hair changes    Compressive symptoms:  Increase in thyroid size: same   Dysphagia: Normal   Shortness of breath when laying flat: No  Hoarseness: More rasppy    Eye symptoms:  Watery eyes  Puffy eyes  Started worsening 2 weeks  Sand sensation  Dry eyes: YEs  Redness: No just puffy   Double vision: No    Smokes but not every day weed    Meds:  Valsaratan-hydrochlorothiazide    FHX:  Aunt has thyroid disease         Latest Reference Range & Units Most Recent 01/06/23 10:44 08/18/23 09:42 09/25/23 10:34 09/27/23 08:33 10/30/23 13:25   Hemoglobin A1C % 5.8 !  1/6/23 10:44 5.8 !       Thyroxine, Free 0.78 - 1.48 ng/dL 0.98  10/30/23 13:25 1.02 1.77 (H) 1.28  0.98   Thyroid Stimulating Hormone 0.44 - 3.98 mIU/L <0.01 (L)  10/30/23 13:25 0.03 (L) <0.01 (L)  <0.01 (L) <0.01 (L)   Vitamin D, 25-Hydroxy, Total ng/mL 21 !  1/6/23 10:44 21 !       GLUCOSE 74 - 99 mg/dL 77  10/30/23 13:25 80    77   Estimated Average Glucose MG/  1/6/23 10:44 120       !: Data is abnormal  (H): Data is abnormally high  (L): Data is abnormally low    Review of Systems  all pertinent systems reviewed and are otherwise negative   Objective   Visit Vitals  BP (!) 137/92 (BP Location: Right arm, Patient Position: Sitting, BP Cuff Size: Adult)   Pulse 74   Temp 36.7 °C (98.1 °F) (Temporal)   Ht 1.676 m (5' 6\")   Wt 78.5 kg (173 lb)   SpO2 99%   BMI 27.92 kg/m²   Smoking Status Never   BSA 1.91 m²      Physical Exam  Constitutional:       General: She is not in acute distress.     Appearance: Normal appearance.   HENT:      Head: Normocephalic and atraumatic.   Eyes:      Extraocular Movements: Extraocular movements intact.      Pupils: Pupils are equal, round, and reactive to light.      Comments: Firm globes bilaterally   Neck:      Comments: ~ 20 g rubbery, multiple nodules felt " bilaterally  Cardiovascular:      Rate and Rhythm: Normal rate and regular rhythm.   Pulmonary:      Effort: Pulmonary effort is normal. No respiratory distress.      Breath sounds: Normal breath sounds.   Abdominal:      General: Bowel sounds are normal.      Palpations: Abdomen is soft.      Tenderness: There is no abdominal tenderness.   Skin:     Coloration: Skin is not jaundiced or pale.      Findings: No erythema or rash.   Neurological:      General: No focal deficit present.      Mental Status: She is alert and oriented to person, place, and time.      Deep Tendon Reflexes: Reflexes abnormal.   Psychiatric:         Mood and Affect: Mood normal.         Behavior: Behavior normal.          Lab Results   Component Value Date    TSH <0.01 (L) 10/30/2023    FREET4 0.98 10/30/2023       Assessment/Plan   Ely Melgar is a 48 y.o. female who I was asked to see in consultation for evaluation of hyperthyroidism.    Patient diagnosed with Graves diasease around August and was started on methimazole 10 mg twice daily  Initial blood work showed a TSH 0.03 in January 2023 with Ft4: 1.02  Repeated in August 2023 TSH < 0.01 and FT4: 1.77  US thyroid in August:   Right-side:   2 cm solid, hypoechoic nodule the right thyroid midpole compatible a TIRADS 4 nodule.  1.8 cm solid, hypoechoic nodule the right thyroid lower pole compatible a TIRADS 4 nodule.  0.8 cm solid, hypoechoic nodule the right thyroid upper pole compatible a TIRADS 4 nodule.     Left side:   2 separate 1 cm upper pole and lower pole solid, hypoechoic nodules compatible with TI-RADS 4 nodules each.  0.7 cm upper pole nodule which also appears solid and hypoechoic compatible a TIRADS 4 nodule.  Repeat blood work in Sept TSH < 0.01  and FT4: 1.28 continued on methimazole 10 mg BID. TSI 3.9 and TRAB 11.9  Saw Dr. Robledo and thyroid nodule bx was done and was benign    Clinically hypothyroid    Plan:  Blood work today and we will decide on methimazole  dose.  If Hypothyroid we might hold methimazole and repeat  Graves ophthalmopathy discussed with patient    RTC in July   Acute pericarditis, unspecified type

## 2024-02-19 NOTE — PATIENT INSTRUCTIONS
Continue methimazole 10 mg twice daily  Blood work today  We will decide on methimazole dose according to results    For your eyes:  Avoid smoking  Eye drops (Natural tears) 3-4 times a day  Elevate bed 30 degrees  Avoid salt  Wear glasses every time you go out of the house     RTC in July

## 2024-02-19 NOTE — PROGRESS NOTES
Subjective   Ely Melgar is a 48 y.o. female who I was asked to see in consultation for evaluation of hyperthyroidism.    Patient diagnosed with Graves diasease around August and was started on methimazole 10 mg twice daily  Initial blood work showed a TSH 0.03 in January 2023 with Ft4: 1.02  Repeated in August 2023 TSH < 0.01 and FT4: 1.77  US thyroid in August:   Right-side:   2 cm solid, hypoechoic nodule the right thyroid midpole compatible a TIRADS 4 nodule.  1.8 cm solid, hypoechoic nodule the right thyroid lower pole compatible a TIRADS 4 nodule.  0.8 cm solid, hypoechoic nodule the right thyroid upper pole compatible a TIRADS 4 nodule.     Left side:   2 separate 1 cm upper pole and lower pole solid, hypoechoic nodules compatible with TI-RADS 4 nodules each.  0.7 cm upper pole nodule which also appears solid and hypoechoic compatible a TIRADS 4 nodule.  Repeat blood work in Sept TSH < 0.01  and FT4: 1.28 continued on methimazole 10 mg BID. TSI 3.9 and TRAB 11.9  Saw Dr. Robledo and thyroid nodule bx was done and was benign    Energy: Good.   Sleep: Was having insomnia and still have some. Goes to bed at 3 am till 10:45. Sometimes feels non rested.   Cold or heat intolerance:- Heat intolerance improved a little bit  Weight: Initially lost weight and now gained it back  Menses: Haven't had one for 2 years  Hot flashes: Yes not everyday around 3 times per week  Night sweats: Sometimes was worse during summer  BM: No change  Tremors: No   Palpitations: More before and now improved   skin or hair changes    Compressive symptoms:  Increase in thyroid size: same   Dysphagia: Normal   Shortness of breath when laying flat: No  Hoarseness: More rasppy    Eye symptoms:  Watery eyes  Puffy eyes  Started worsening 2 weeks  Sand sensation  Dry eyes: YEs  Redness: No just puffy   Double vision: No    Smokes but not every day weed    Meds:  Valsaratan-hydrochlorothiazide    FHX:  Aunt has thyroid disease         Latest  "Reference Range & Units Most Recent 01/06/23 10:44 08/18/23 09:42 09/25/23 10:34 09/27/23 08:33 10/30/23 13:25   Hemoglobin A1C % 5.8 !  1/6/23 10:44 5.8 !       Thyroxine, Free 0.78 - 1.48 ng/dL 0.98  10/30/23 13:25 1.02 1.77 (H) 1.28  0.98   Thyroid Stimulating Hormone 0.44 - 3.98 mIU/L <0.01 (L)  10/30/23 13:25 0.03 (L) <0.01 (L)  <0.01 (L) <0.01 (L)   Vitamin D, 25-Hydroxy, Total ng/mL 21 !  1/6/23 10:44 21 !       GLUCOSE 74 - 99 mg/dL 77  10/30/23 13:25 80    77   Estimated Average Glucose MG/  1/6/23 10:44 120       !: Data is abnormal  (H): Data is abnormally high  (L): Data is abnormally low    Review of Systems  all pertinent systems reviewed and are otherwise negative   Objective   Visit Vitals  BP (!) 137/92 (BP Location: Right arm, Patient Position: Sitting, BP Cuff Size: Adult)   Pulse 74   Temp 36.7 °C (98.1 °F) (Temporal)   Ht 1.676 m (5' 6\")   Wt 78.5 kg (173 lb)   SpO2 99%   BMI 27.92 kg/m²   Smoking Status Never   BSA 1.91 m²      Physical Exam  Constitutional:       General: She is not in acute distress.     Appearance: Normal appearance.   HENT:      Head: Normocephalic and atraumatic.   Eyes:      Extraocular Movements: Extraocular movements intact.      Pupils: Pupils are equal, round, and reactive to light.      Comments: Firm globes bilaterally   Neck:      Comments: ~ 20 g rubbery, multiple nodules felt bilaterally  Cardiovascular:      Rate and Rhythm: Normal rate and regular rhythm.   Pulmonary:      Effort: Pulmonary effort is normal. No respiratory distress.      Breath sounds: Normal breath sounds.   Abdominal:      General: Bowel sounds are normal.      Palpations: Abdomen is soft.      Tenderness: There is no abdominal tenderness.   Skin:     Coloration: Skin is not jaundiced or pale.      Findings: No erythema or rash.   Neurological:      General: No focal deficit present.      Mental Status: She is alert and oriented to person, place, and time.      Deep Tendon Reflexes: " Reflexes abnormal.   Psychiatric:         Mood and Affect: Mood normal.         Behavior: Behavior normal.          Lab Results   Component Value Date    TSH <0.01 (L) 10/30/2023    FREET4 0.98 10/30/2023       Assessment/Plan   Ely Melgar is a 48 y.o. female who I was asked to see in consultation for evaluation of hyperthyroidism.    Patient diagnosed with Graves diasease around August and was started on methimazole 10 mg twice daily  Initial blood work showed a TSH 0.03 in January 2023 with Ft4: 1.02  Repeated in August 2023 TSH < 0.01 and FT4: 1.77  US thyroid in August:   Right-side:   2 cm solid, hypoechoic nodule the right thyroid midpole compatible a TIRADS 4 nodule.  1.8 cm solid, hypoechoic nodule the right thyroid lower pole compatible a TIRADS 4 nodule.  0.8 cm solid, hypoechoic nodule the right thyroid upper pole compatible a TIRADS 4 nodule.     Left side:   2 separate 1 cm upper pole and lower pole solid, hypoechoic nodules compatible with TI-RADS 4 nodules each.  0.7 cm upper pole nodule which also appears solid and hypoechoic compatible a TIRADS 4 nodule.  Repeat blood work in Sept TSH < 0.01  and FT4: 1.28 continued on methimazole 10 mg BID. TSI 3.9 and TRAB 11.9  Saw Dr. Robledo and thyroid nodule bx was done and was benign    Clinically hypothyroid    Plan:  Blood work today and we will decide on methimazole dose.  If Hypothyroid we might hold methimazole and repeat  Graves ophthalmopathy discussed with patient    RTC in July

## 2024-02-19 NOTE — TELEPHONE ENCOUNTER
Blood work reviewed  TSH elevated with low FT4 and T3  Called patient and asked her to hold methimazole and repeat blood work in 1 week,

## 2024-02-23 ENCOUNTER — APPOINTMENT (OUTPATIENT)
Dept: PRIMARY CARE | Facility: CLINIC | Age: 49
End: 2024-02-23
Payer: COMMERCIAL

## 2024-02-23 LAB — TSI SER-ACNC: 2.4 TSI INDEX

## 2024-03-05 ENCOUNTER — LAB (OUTPATIENT)
Dept: LAB | Facility: LAB | Age: 49
End: 2024-03-05
Payer: COMMERCIAL

## 2024-03-05 DIAGNOSIS — E05.90 HYPERTHYROIDISM: ICD-10-CM

## 2024-03-05 PROCEDURE — 84439 ASSAY OF FREE THYROXINE: CPT

## 2024-03-05 PROCEDURE — 36415 COLL VENOUS BLD VENIPUNCTURE: CPT

## 2024-03-05 PROCEDURE — 84480 ASSAY TRIIODOTHYRONINE (T3): CPT

## 2024-03-05 PROCEDURE — 80053 COMPREHEN METABOLIC PANEL: CPT

## 2024-03-05 PROCEDURE — 84443 ASSAY THYROID STIM HORMONE: CPT

## 2024-03-06 LAB
ALBUMIN SERPL BCP-MCNC: 4.3 G/DL (ref 3.4–5)
ALP SERPL-CCNC: 119 U/L (ref 33–110)
ALT SERPL W P-5'-P-CCNC: 13 U/L (ref 7–45)
ANION GAP SERPL CALC-SCNC: 12 MMOL/L (ref 10–20)
AST SERPL W P-5'-P-CCNC: 16 U/L (ref 9–39)
BILIRUB SERPL-MCNC: 0.6 MG/DL (ref 0–1.2)
BUN SERPL-MCNC: 13 MG/DL (ref 6–23)
CALCIUM SERPL-MCNC: 9.4 MG/DL (ref 8.6–10.6)
CHLORIDE SERPL-SCNC: 107 MMOL/L (ref 98–107)
CO2 SERPL-SCNC: 26 MMOL/L (ref 21–32)
CREAT SERPL-MCNC: 0.72 MG/DL (ref 0.5–1.05)
EGFRCR SERPLBLD CKD-EPI 2021: >90 ML/MIN/1.73M*2
GLUCOSE SERPL-MCNC: 96 MG/DL (ref 74–99)
POTASSIUM SERPL-SCNC: 4 MMOL/L (ref 3.5–5.3)
PROT SERPL-MCNC: 7 G/DL (ref 6.4–8.2)
SODIUM SERPL-SCNC: 141 MMOL/L (ref 136–145)
T3 SERPL-MCNC: 133 NG/DL (ref 60–200)
T4 FREE SERPL-MCNC: 0.84 NG/DL (ref 0.78–1.48)
TSH SERPL-ACNC: 1.5 MIU/L (ref 0.44–3.98)

## 2024-03-12 ENCOUNTER — TELEPHONE (OUTPATIENT)
Dept: PRIMARY CARE | Facility: HOSPITAL | Age: 49
End: 2024-03-12
Payer: COMMERCIAL

## 2024-03-12 DIAGNOSIS — E05.00 GRAVES DISEASE: Primary | ICD-10-CM

## 2024-03-12 RX ORDER — METHIMAZOLE 5 MG/1
5 TABLET ORAL DAILY
Qty: 30 TABLET | Refills: 11 | Status: SHIPPED | OUTPATIENT
Start: 2024-03-12 | End: 2025-03-12

## 2024-03-12 NOTE — TELEPHONE ENCOUNTER
Discussed labs with patient  TSH improved from 58 to 1.5 in 2 weeks after stopping methimazole (was on 10 mg BID)  We will restart 5 mg once daily and recheck in 4 weeks

## 2024-03-20 DIAGNOSIS — I10 HYPERTENSION, UNSPECIFIED TYPE: ICD-10-CM

## 2024-03-20 RX ORDER — VALSARTAN AND HYDROCHLOROTHIAZIDE 160; 12.5 MG/1; MG/1
1 TABLET, FILM COATED ORAL DAILY
Qty: 30 TABLET | Refills: 0 | Status: SHIPPED | OUTPATIENT
Start: 2024-03-20

## 2024-04-02 ENCOUNTER — OFFICE VISIT (OUTPATIENT)
Dept: PRIMARY CARE | Facility: CLINIC | Age: 49
End: 2024-04-02
Payer: COMMERCIAL

## 2024-04-02 DIAGNOSIS — E55.9 VITAMIN D DEFICIENCY: ICD-10-CM

## 2024-04-02 DIAGNOSIS — Z12.11 COLON CANCER SCREENING: ICD-10-CM

## 2024-04-02 DIAGNOSIS — E53.8 VITAMIN B12 DEFICIENCY: ICD-10-CM

## 2024-04-02 DIAGNOSIS — Z12.31 VISIT FOR SCREENING MAMMOGRAM: ICD-10-CM

## 2024-04-02 DIAGNOSIS — E05.00 GRAVES DISEASE: ICD-10-CM

## 2024-04-02 DIAGNOSIS — Z00.00 ROUTINE GENERAL MEDICAL EXAMINATION AT A HEALTH CARE FACILITY: Primary | ICD-10-CM

## 2024-04-02 DIAGNOSIS — I10 BENIGN HYPERTENSION: ICD-10-CM

## 2024-04-02 PROBLEM — N93.9 ABNORMAL UTERINE BLEEDING: Status: ACTIVE | Noted: 2024-04-02

## 2024-04-02 PROBLEM — S63.103A: Status: ACTIVE | Noted: 2024-04-02

## 2024-04-02 PROBLEM — M72.2 PLANTAR FASCIITIS: Status: ACTIVE | Noted: 2024-04-02

## 2024-04-02 PROBLEM — R69 DISORDER: Status: ACTIVE | Noted: 2024-04-02

## 2024-04-02 PROCEDURE — 3078F DIAST BP <80 MM HG: CPT | Performed by: FAMILY MEDICINE

## 2024-04-02 PROCEDURE — 3074F SYST BP LT 130 MM HG: CPT | Performed by: FAMILY MEDICINE

## 2024-04-02 PROCEDURE — 99214 OFFICE O/P EST MOD 30 MIN: CPT | Performed by: FAMILY MEDICINE

## 2024-04-02 NOTE — PROGRESS NOTES
Chief Complaint/HPI:  Here for follow up   HTN takin meds BP controlled  Saw endo  Feels eyes are dry in the morning      ROS otherwise negative aside from what was mentioned above in HPI.      Patient Active Problem List   Diagnosis    Abnormal blood findings    Acne    Benign hypertension    Breast hypertrophy    Esophageal reflux    Hair loss    Lateral epicondylitis of right elbow    Lymphadenitis    Overweight    Rosacea    Colon cancer screening    Left wrist pain    Vitamin D deficiency    Vitamin B12 deficiency    Cervical cancer screening    Hyperthyroidism    Graves disease    Abnormal uterine bleeding    Disorder    Plantar fasciitis    Subluxation of thumb    Visit for screening mammogram    Routine general medical examination at a health care facility     Past Medical History:   Diagnosis Date    HTN (hypertension)     Thyroid nodule     Unspecified abdominal hernia without obstruction or gangrene     Hernia     No past surgical history on file.  Family History   Problem Relation Name Age of Onset    Hypertension Father       Social History     Tobacco Use    Smoking status: Never    Smokeless tobacco: Never   Substance Use Topics    Alcohol use: Yes     Comment: social drinker    Drug use: Yes     Types: Marijuana         ALLERGIES  No Known Allergies      MEDICATIONS  Current Outpatient Medications   Medication Sig Dispense Refill    clindamycin (Cleocin T) 1 % lotion APPLY SPARINGLY TO AFFECTED AREA(S) ONCE DAILY      cyanocobalamin, vitamin B-12, 1,000 mcg tablet, sublingual Take 1 tablet (1,000 mcg) by mouth once daily. 90 tablet 1    ergocalciferol (Vitamin D2) 1.25 MG (15364 UT) capsule TAKE ONE CAPSULE BY MOUTH EVERY WEEK 13 capsule 1     mg tablet TAKE ONE TABLET BY MOUTH EVERY 8 HOURS AS NEEDED FOR PAIN      methIMAzole (Tapazole) 5 mg tablet Take 1 tablet (5 mg) by mouth once daily. 30 tablet 11    tretinoin (Retin-A) 0.1 % cream Apply sparingly to affected area once daily at bedtime       valsartan-hydrochlorothiazide (Diovan-HCT) 160-12.5 mg tablet Take 1 tablet by mouth once daily. 30 tablet 0     No current facility-administered medications for this visit.         PHYSICAL EXAM    Body mass index is 28.25 kg/m².  Gen: Alert, NAD  HEENT:  PERRLA, EOMI, conjunctiva and sclera normal in appearance  Respiratory:  Lungs CTAB  Cardiovascular:  Heart RRR. No M/R/G  Neuro:  Gross motor and sensory intact  Skin:  No suspicious lesions present    ASSESSMENT/PLAN  Problem List Items Addressed This Visit       Benign hypertension    Current Assessment & Plan     Continue current medication valsartan  Patient's blood pressure is at goal of 130/85 or less. Condition is stable. Continue current medications and treatment plan.  I recommend that you exercise for 30-45 minutes 5 days a week.  I also recommend a balanced diet with fruits and vegetables every day, lean meats, and little fried foods. The DASH diet (you can find this online) is a good example of this.           Colon cancer screening    Relevant Orders    Colonoscopy Screening; Average Risk Patient    Vitamin D deficiency    Current Assessment & Plan     C/w vit D         Vitamin B12 deficiency    Graves disease    Current Assessment & Plan     F/up with endocrinology  Will refer to Ophthalmology for Graves ophthalmopathy.   Monitor weight   Labs in mid April.           Relevant Orders    Referral to Ophthalmology    Visit for screening mammogram    Relevant Orders    BI mammo bilateral screening tomosynthesis    Routine general medical examination at a health care facility - Primary    Relevant Orders    Lipid Panel    CBC    Hemoglobin A1C           Chadwick Munroe MD

## 2024-04-03 VITALS
BODY MASS INDEX: 28.25 KG/M2 | HEART RATE: 82 BPM | SYSTOLIC BLOOD PRESSURE: 124 MMHG | DIASTOLIC BLOOD PRESSURE: 72 MMHG | WEIGHT: 175 LBS

## 2024-04-03 PROBLEM — E53.8 VITAMIN B 12 DEFICIENCY: Status: RESOLVED | Noted: 2023-07-11 | Resolved: 2024-04-03

## 2024-04-03 PROBLEM — E55.9 VITAMIN D DEFICIENCY DISEASE: Status: RESOLVED | Noted: 2023-07-11 | Resolved: 2024-04-03

## 2024-04-03 NOTE — ASSESSMENT & PLAN NOTE
F/up with endocrinology  Will refer to Ophthalmology for Graves ophthalmopathy.   Monitor weight   Labs in mid April.

## 2024-04-03 NOTE — ASSESSMENT & PLAN NOTE
Continue current medication valsartan  Patient's blood pressure is at goal of 130/85 or less. Condition is stable. Continue current medications and treatment plan.  I recommend that you exercise for 30-45 minutes 5 days a week.  I also recommend a balanced diet with fruits and vegetables every day, lean meats, and little fried foods. The DASH diet (you can find this online) is a good example of this.

## 2024-04-08 ENCOUNTER — APPOINTMENT (OUTPATIENT)
Dept: RADIOLOGY | Facility: HOSPITAL | Age: 49
End: 2024-04-08
Payer: COMMERCIAL

## 2024-04-08 ENCOUNTER — HOSPITAL ENCOUNTER (EMERGENCY)
Facility: HOSPITAL | Age: 49
Discharge: HOME | End: 2024-04-08
Payer: COMMERCIAL

## 2024-04-08 VITALS
WEIGHT: 173 LBS | DIASTOLIC BLOOD PRESSURE: 88 MMHG | BODY MASS INDEX: 28.82 KG/M2 | SYSTOLIC BLOOD PRESSURE: 122 MMHG | HEART RATE: 66 BPM | OXYGEN SATURATION: 97 % | TEMPERATURE: 98.2 F | RESPIRATION RATE: 20 BRPM | HEIGHT: 65 IN

## 2024-04-08 DIAGNOSIS — S93.401A SPRAIN OF RIGHT ANKLE, INITIAL ENCOUNTER: ICD-10-CM

## 2024-04-08 DIAGNOSIS — S92.154A CLOSED NONDISPLACED AVULSION FRACTURE OF RIGHT TALUS, INITIAL ENCOUNTER: Primary | ICD-10-CM

## 2024-04-08 PROCEDURE — 73590 X-RAY EXAM OF LOWER LEG: CPT | Mod: RT

## 2024-04-08 PROCEDURE — 99284 EMERGENCY DEPT VISIT MOD MDM: CPT | Mod: 25

## 2024-04-08 PROCEDURE — 73590 X-RAY EXAM OF LOWER LEG: CPT | Mod: RIGHT SIDE | Performed by: RADIOLOGY

## 2024-04-08 PROCEDURE — 73610 X-RAY EXAM OF ANKLE: CPT | Mod: RIGHT SIDE | Performed by: RADIOLOGY

## 2024-04-08 PROCEDURE — 73630 X-RAY EXAM OF FOOT: CPT | Mod: RT

## 2024-04-08 PROCEDURE — 73630 X-RAY EXAM OF FOOT: CPT | Mod: RIGHT SIDE | Performed by: RADIOLOGY

## 2024-04-08 PROCEDURE — 2500000001 HC RX 250 WO HCPCS SELF ADMINISTERED DRUGS (ALT 637 FOR MEDICARE OP): Performed by: PHYSICIAN ASSISTANT

## 2024-04-08 PROCEDURE — 73610 X-RAY EXAM OF ANKLE: CPT | Mod: RT

## 2024-04-08 PROCEDURE — 29515 APPLICATION SHORT LEG SPLINT: CPT | Mod: RT

## 2024-04-08 RX ORDER — ACETAMINOPHEN 325 MG/1
650 TABLET ORAL ONCE
Status: COMPLETED | OUTPATIENT
Start: 2024-04-08 | End: 2024-04-08

## 2024-04-08 RX ORDER — IBUPROFEN 600 MG/1
600 TABLET ORAL ONCE
Status: COMPLETED | OUTPATIENT
Start: 2024-04-08 | End: 2024-04-08

## 2024-04-08 RX ADMIN — ACETAMINOPHEN 650 MG: 325 TABLET ORAL at 12:40

## 2024-04-08 RX ADMIN — IBUPROFEN 600 MG: 600 TABLET, FILM COATED ORAL at 12:40

## 2024-04-08 ASSESSMENT — PAIN - FUNCTIONAL ASSESSMENT
PAIN_FUNCTIONAL_ASSESSMENT: 0-10
PAIN_FUNCTIONAL_ASSESSMENT: 0-10

## 2024-04-08 ASSESSMENT — PAIN SCALES - GENERAL
PAINLEVEL_OUTOF10: 7
PAINLEVEL_OUTOF10: 7

## 2024-04-08 ASSESSMENT — PAIN DESCRIPTION - DESCRIPTORS: DESCRIPTORS: THROBBING

## 2024-04-08 ASSESSMENT — PAIN DESCRIPTION - PAIN TYPE: TYPE: ACUTE PAIN

## 2024-04-08 ASSESSMENT — COLUMBIA-SUICIDE SEVERITY RATING SCALE - C-SSRS
6. HAVE YOU EVER DONE ANYTHING, STARTED TO DO ANYTHING, OR PREPARED TO DO ANYTHING TO END YOUR LIFE?: NO
2. HAVE YOU ACTUALLY HAD ANY THOUGHTS OF KILLING YOURSELF?: NO
1. IN THE PAST MONTH, HAVE YOU WISHED YOU WERE DEAD OR WISHED YOU COULD GO TO SLEEP AND NOT WAKE UP?: NO

## 2024-04-08 ASSESSMENT — PAIN DESCRIPTION - LOCATION: LOCATION: ANKLE

## 2024-04-08 NOTE — Clinical Note
Ely Melgar was seen and treated in our emergency department on 4/8/2024.  She may return to work on 04/22/2024.  If patient continue nonweightbearing work such as desk sitting, she may return to work.  Otherwise will need to be cleared by orthopedics.     If you have any questions or concerns, please don't hesitate to call.      Cintia Rosen PA-C

## 2024-04-08 NOTE — ED PROVIDER NOTES
HPI     CC: Ankle Pain     HPI: Ely Melgar is a 48 y.o. female with past medical history of hypertension, vitamin deficiencies, and Graves' disease presents with concern for mechanical fall down 3 steps resulting in right ankle pain.  Patient states that she just slipped when walking down her 3 steps outside and fell right onto her right ankle.  Denies head strike, loss of consciousness, or blood thinner use.  She has not taken anything for the pain at this time.  She reports no numbness or tingling or history of surgery to this right ankle.  She has had a difficult time bearing weight on it due to the pain.  No other acute complaints.    ROS: 10-point review of systems was performed and is otherwise negative except as noted in HPI.      Past Medical History: Noncontributory except per HPI     Past Surgical History: Noncontributory except per HPI     Family History: Reviewed and noncontributory     Social History:  Noncontributory except per HPI       No Known Allergies    Home Meds:   Current Outpatient Medications   Medication Instructions    clindamycin (Cleocin T) 1 % lotion APPLY SPARINGLY TO AFFECTED AREA(S) ONCE DAILY    cyanocobalamin (vitamin B-12) 1,000 mcg, oral, Daily    ergocalciferol (Vitamin D2) 1.25 MG (51582 UT) capsule TAKE ONE CAPSULE BY MOUTH EVERY WEEK     mg tablet TAKE ONE TABLET BY MOUTH EVERY 8 HOURS AS NEEDED FOR PAIN    methIMAzole (TAPAZOLE) 5 mg, oral, Daily    tretinoin (Retin-A) 0.1 % cream Apply sparingly to affected area once daily at bedtime    valsartan-hydrochlorothiazide (Diovan-HCT) 160-12.5 mg tablet 1 tablet, oral, Daily        ED Triage Vitals [04/08/24 1223]   Temperature Heart Rate Respirations BP   36.8 °C (98.2 °F) 73 20 117/78      Pulse Ox Temp Source Heart Rate Source Patient Position   98 % Oral -- --      BP Location FiO2 (%)     -- --         Heart Rate:  [66-73]   Temperature:  [36.8 °C (98.2 °F)]   Respirations:  [20]   BP: (117-122)/(78-88)   Height:  " [165.1 cm (5' 5\")]   Weight:  [78.5 kg (173 lb)]   Pulse Ox:  [97 %-98 %]      Physical Exam:  Physical Exam  Constitutional:       General: She is not in acute distress.     Appearance: Normal appearance. She is not ill-appearing.   HENT:      Head: Normocephalic and atraumatic.      Right Ear: External ear normal.      Left Ear: External ear normal.      Nose: Nose normal.      Mouth/Throat:      Mouth: Mucous membranes are moist.   Eyes:      Extraocular Movements: Extraocular movements intact.      Conjunctiva/sclera: Conjunctivae normal.      Pupils: Pupils are equal, round, and reactive to light.   Cardiovascular:      Rate and Rhythm: Normal rate and regular rhythm.      Pulses: Normal pulses.   Pulmonary:      Effort: Pulmonary effort is normal. No respiratory distress.      Breath sounds: Normal breath sounds.   Abdominal:      General: Abdomen is flat.      Palpations: Abdomen is soft.      Tenderness: There is no abdominal tenderness.   Musculoskeletal:      Cervical back: Normal range of motion and neck supple.      Comments: Right ankle with lateral malleolus tenderness and significant swelling without redness or warmth.  Patient is able to complete some range of motion of the ankle with minimal discomfort.  2+ DP pulse.  No calf or fibular head tenderness.  Sensation grossly intact.  Did not attempt strength testing due to significant amount of swelling.   Skin:     General: Skin is warm and dry.      Capillary Refill: Capillary refill takes less than 2 seconds.   Neurological:      General: No focal deficit present.      Mental Status: She is alert and oriented to person, place, and time.   Psychiatric:         Mood and Affect: Mood normal.          Diagnostic Results        Labs Reviewed - No data to display      XR ankle right 3+ views   Final Result   Possible avulsion from the lateral talus secondary to inversion   injury. See discussion above.             MACRO:   None        Signed by: Kiel " Schoenberger 4/8/2024 1:10 PM   Dictation workstation:   [id number]      XR foot right 3+ views   Final Result   No definite acute findings             MACRO:   None        Signed by: Joseph Schoenberger 4/8/2024 1:11 PM   Dictation workstation:   [id number]      XR tibia fibula right 2 views   Final Result   Possible inversion injury resulting in talar or calcaneal avulsion.   See discussion above             MACRO:   None        Signed by: Joseph Schoenberger 4/8/2024 1:13 PM   Dictation workstation:   [id number]                    Mo Coma Scale Score: 15                  Procedure  Procedures    ED Course & MDM   Assessment/Plan:     Medications   acetaminophen (Tylenol) tablet 650 mg (650 mg oral Given 4/8/24 1240)   ibuprofen tablet 600 mg (600 mg oral Given 4/8/24 1240)        Diagnoses as of 04/08/24 1403   Sprain of right ankle, initial encounter   Closed nondisplaced avulsion fracture of right talus, initial encounter       Medical Decision Making    Ely Melgar is a 48 y.o. female with past medical history of hypertension, vitamin deficiencies, and Graves' disease presents with concern for mechanical fall down 3 steps resulting in right ankle pain.  Patient is nontoxic-appearing and her vital signs are normal.  Based on her presentation, differential diagnosis includes right ankle fracture dislocation, or right ankle fracture or sprain.  Will obtain three-view right ankle x-rays, 3 view right foot x-rays, and 2 view right tib-fib x-rays and reevaluate.  Will give the patient Tylenol and Motrin for pain control.  X-rays resulted and showed possible avulsion of the lateral talus secondary to inversion injury.  Orthopedics was called and stated that the treatment for this would be such as for a severe strain.  Recommended splint and nonweightbearing.  Results were conveyed to the patient.  She was amenable to a splint.  This was placed by medic, short leg splint.  I did evaluate her after  splint placement and she was able to wiggle her toes with good capillary refill.  Plan will be crutches and nonweightbearing.  No further workup indicated.    Right ankle sprain/avulsion fracture: Patient was educated about the findings and the discussion with orthopedics.  Plan will be nonweightbearing in the splint with crutches.  We did discuss that she can use Tylenol and Motrin for pain control as well as ice and rest.  She does work a job where she is on her feet, so I did write her off for 1 to 2 weeks until she can see orthopedics unless she can have accommodations at work.  Her pain was already improved after splint placement and with nonweightbearing.  Do not feel this injury warrants narcotics at this time.  Patient in agreement.  An orthopedic follow-up was ordered for her with plan to see them within 1 to 2 weeks.  We also discussed that the swelling will likely start to go down, so the splint may start to feel loose, this is normal.  Should she develop any increased numbness or tingling, color changes of the toes, increased swelling, or significant pain in the leg, recommended returning to the nearest emergency department.  Patient agreeable to plan of care and felt comfortable returning home.    Disposition: Home    ED Prescriptions    None         Social Determinants Affecting Care: none     Cintia Rosen PA-C    This note was dictated by speech recognition. Minor errors in transcription may be present.     Cintia Rosen PA-C  04/08/24 [id number]

## 2024-04-08 NOTE — ED TRIAGE NOTES
Pt coming in today because she was going down the stairs and slipped and landed on her ankle. Ankle looks deformed at this time, but is able to move the toes and has sensation on the foot. At this time pt is not really able to place any weight on the ankle. Ankle is swollen.

## 2024-04-12 ENCOUNTER — OFFICE VISIT (OUTPATIENT)
Dept: ORTHOPEDIC SURGERY | Facility: CLINIC | Age: 49
End: 2024-04-12
Payer: COMMERCIAL

## 2024-04-12 ENCOUNTER — APPOINTMENT (OUTPATIENT)
Dept: ORTHOPEDIC SURGERY | Facility: CLINIC | Age: 49
End: 2024-04-12
Payer: COMMERCIAL

## 2024-04-12 VITALS — BODY MASS INDEX: 27.99 KG/M2 | WEIGHT: 168 LBS | HEIGHT: 65 IN

## 2024-04-12 DIAGNOSIS — S93.409A SECOND DEGREE ANKLE SPRAIN: Primary | ICD-10-CM

## 2024-04-12 ASSESSMENT — COLUMBIA-SUICIDE SEVERITY RATING SCALE - C-SSRS
1. IN THE PAST MONTH, HAVE YOU WISHED YOU WERE DEAD OR WISHED YOU COULD GO TO SLEEP AND NOT WAKE UP?: NO
6. HAVE YOU EVER DONE ANYTHING, STARTED TO DO ANYTHING, OR PREPARED TO DO ANYTHING TO END YOUR LIFE?: NO
2. HAVE YOU ACTUALLY HAD ANY THOUGHTS OF KILLING YOURSELF?: NO

## 2024-04-30 ENCOUNTER — OFFICE VISIT (OUTPATIENT)
Dept: ORTHOPEDIC SURGERY | Facility: CLINIC | Age: 49
End: 2024-04-30
Payer: COMMERCIAL

## 2024-04-30 VITALS — WEIGHT: 167 LBS | BODY MASS INDEX: 27.82 KG/M2 | HEIGHT: 65 IN

## 2024-04-30 DIAGNOSIS — S93.409A SECOND DEGREE ANKLE SPRAIN: Primary | ICD-10-CM

## 2024-04-30 PROCEDURE — 99213 OFFICE O/P EST LOW 20 MIN: CPT | Performed by: ORTHOPAEDIC SURGERY

## 2024-04-30 PROCEDURE — 1036F TOBACCO NON-USER: CPT | Performed by: ORTHOPAEDIC SURGERY

## 2024-04-30 ASSESSMENT — PAIN SCALES - GENERAL: PAINLEVEL: 0-NO PAIN

## 2024-04-30 NOTE — LETTER
April 30, 2024     Patient: Ely Melgar   YOB: 1975   Date of Visit: 4/30/2024       To Whom It May Concern:    It is my medical opinion that Ely Melgar may return to work on 5/1/2024  .    If you have any questions or concerns, please don't hesitate to call.         Sincerely,        Cali Hendricks MD    CC: No Recipients

## 2024-04-30 NOTE — PROGRESS NOTES
Follow-up right ankle sprain.  Feels better.  Is been wearing Ace bandage and Aircast.  She has been out of work.  She feels substantially better.  Past medical,family and social histories have been reviewed and are up to date.  All other body systems have been reviewed and are negative for complaint.  Constitutional: Well-developed well-nourished   Eyes: Sclerae anicteric, pupils equal and round  HENT: Normocephalic atraumatic  Cardiovascular: Pulses full, regular rate and rhythm  Respiratory: Breathing not labored, no wheezing  Integumentary: Skin intact, no lesions or rashes  Neurological: Sensation intact, no gross strength deficits, reflexes equal  Psychiatric: Alert oriented and appropriate  Hematologic/lymphatic: No lymphadenopathy  Right foot and ankle mild swelling mild lateral tenderness mobility is 90%  Assessment healing ankle sprain plan demonstrated home exercises recommended continued wrapping for swelling control discussed expectations back to work tomorrow follow-up as needed   108

## 2024-05-30 DIAGNOSIS — I10 HYPERTENSION, UNSPECIFIED TYPE: ICD-10-CM

## 2024-05-30 RX ORDER — VALSARTAN AND HYDROCHLOROTHIAZIDE 160; 12.5 MG/1; MG/1
1 TABLET, FILM COATED ORAL DAILY
Qty: 30 TABLET | Refills: 0 | OUTPATIENT
Start: 2024-05-30

## 2024-06-26 ENCOUNTER — APPOINTMENT (OUTPATIENT)
Dept: ENDOCRINOLOGY | Facility: HOSPITAL | Age: 49
End: 2024-06-26
Payer: COMMERCIAL

## 2024-07-18 ENCOUNTER — APPOINTMENT (OUTPATIENT)
Dept: ENDOCRINOLOGY | Facility: CLINIC | Age: 49
End: 2024-07-18
Payer: COMMERCIAL

## 2024-07-18 ENCOUNTER — LAB (OUTPATIENT)
Dept: LAB | Facility: LAB | Age: 49
End: 2024-07-18
Payer: COMMERCIAL

## 2024-07-18 VITALS
SYSTOLIC BLOOD PRESSURE: 149 MMHG | TEMPERATURE: 97.7 F | HEIGHT: 65 IN | HEART RATE: 65 BPM | BODY MASS INDEX: 27.89 KG/M2 | DIASTOLIC BLOOD PRESSURE: 94 MMHG | WEIGHT: 167.4 LBS

## 2024-07-18 DIAGNOSIS — I10 HYPERTENSION, UNSPECIFIED TYPE: ICD-10-CM

## 2024-07-18 DIAGNOSIS — E55.9 VITAMIN D DEFICIENCY: ICD-10-CM

## 2024-07-18 DIAGNOSIS — E05.90 HYPERTHYROIDISM: ICD-10-CM

## 2024-07-18 DIAGNOSIS — E05.90 HYPERTHYROIDISM: Primary | ICD-10-CM

## 2024-07-18 LAB
ALBUMIN SERPL BCP-MCNC: 4.4 G/DL (ref 3.4–5)
ALP SERPL-CCNC: 86 U/L (ref 33–110)
ALT SERPL W P-5'-P-CCNC: 11 U/L (ref 7–45)
ANION GAP SERPL CALC-SCNC: 14 MMOL/L (ref 10–20)
AST SERPL W P-5'-P-CCNC: 14 U/L (ref 9–39)
BILIRUB SERPL-MCNC: 0.5 MG/DL (ref 0–1.2)
BUN SERPL-MCNC: 12 MG/DL (ref 6–23)
CALCIUM SERPL-MCNC: 9.7 MG/DL (ref 8.6–10.6)
CHLORIDE SERPL-SCNC: 105 MMOL/L (ref 98–107)
CO2 SERPL-SCNC: 27 MMOL/L (ref 21–32)
CREAT SERPL-MCNC: 0.8 MG/DL (ref 0.5–1.05)
EGFRCR SERPLBLD CKD-EPI 2021: >90 ML/MIN/1.73M*2
GLUCOSE SERPL-MCNC: 93 MG/DL (ref 74–99)
POTASSIUM SERPL-SCNC: 4.1 MMOL/L (ref 3.5–5.3)
PROT SERPL-MCNC: 7.3 G/DL (ref 6.4–8.2)
SODIUM SERPL-SCNC: 142 MMOL/L (ref 136–145)
T3 SERPL-MCNC: 132 NG/DL (ref 60–200)
T4 FREE SERPL-MCNC: 0.92 NG/DL (ref 0.78–1.48)
TSH SERPL-ACNC: 7.53 MIU/L (ref 0.44–3.98)

## 2024-07-18 PROCEDURE — 84439 ASSAY OF FREE THYROXINE: CPT

## 2024-07-18 PROCEDURE — 80053 COMPREHEN METABOLIC PANEL: CPT

## 2024-07-18 PROCEDURE — 84443 ASSAY THYROID STIM HORMONE: CPT

## 2024-07-18 PROCEDURE — 99214 OFFICE O/P EST MOD 30 MIN: CPT | Performed by: STUDENT IN AN ORGANIZED HEALTH CARE EDUCATION/TRAINING PROGRAM

## 2024-07-18 PROCEDURE — 3077F SYST BP >= 140 MM HG: CPT | Performed by: STUDENT IN AN ORGANIZED HEALTH CARE EDUCATION/TRAINING PROGRAM

## 2024-07-18 PROCEDURE — 3080F DIAST BP >= 90 MM HG: CPT | Performed by: STUDENT IN AN ORGANIZED HEALTH CARE EDUCATION/TRAINING PROGRAM

## 2024-07-18 PROCEDURE — 3008F BODY MASS INDEX DOCD: CPT | Performed by: STUDENT IN AN ORGANIZED HEALTH CARE EDUCATION/TRAINING PROGRAM

## 2024-07-18 PROCEDURE — 36415 COLL VENOUS BLD VENIPUNCTURE: CPT

## 2024-07-18 PROCEDURE — 84480 ASSAY TRIIODOTHYRONINE (T3): CPT

## 2024-07-18 PROCEDURE — 84445 ASSAY OF TSI GLOBULIN: CPT

## 2024-07-18 RX ORDER — ERGOCALCIFEROL 1.25 MG/1
CAPSULE ORAL
Qty: 13 CAPSULE | Refills: 1 | Status: SHIPPED | OUTPATIENT
Start: 2024-07-18

## 2024-07-18 RX ORDER — VALSARTAN AND HYDROCHLOROTHIAZIDE 160; 12.5 MG/1; MG/1
1 TABLET, FILM COATED ORAL DAILY
Qty: 30 TABLET | Refills: 0 | Status: SHIPPED | OUTPATIENT
Start: 2024-07-18

## 2024-07-18 NOTE — PROGRESS NOTES
Subjective   Ely Melgar is a 48 y.o. female   Ely Melgar is a 48 y.o. female who I was asked to see in consultation for evaluation of hyperthyroidism.    Patient diagnosed with Graves diasease around August and was started on methimazole 10 mg twice daily  Initial blood work showed a TSH 0.03 in January 2023 with Ft4: 1.02  Repeated in August 2023 TSH < 0.01 and FT4: 1.77  US thyroid in August:   Right-side:   2 cm solid, hypoechoic nodule the right thyroid midpole compatible a TIRADS 4 nodule.  1.8 cm solid, hypoechoic nodule the right thyroid lower pole compatible a TIRADS 4 nodule.  0.8 cm solid, hypoechoic nodule the right thyroid upper pole compatible a TIRADS 4 nodule.     Left side:   2 separate 1 cm upper pole and lower pole solid, hypoechoic nodules compatible with TI-RADS 4 nodules each.  0.7 cm upper pole nodule which also appears solid and hypoechoic compatible a TIRADS 4 nodule.  Repeat blood work in Sept TSH < 0.01  and FT4: 1.28 continued on methimazole 10 mg BID. TSI 3.9 and TRAB 11.9  Saw Dr. Robledo and thyroid nodule bx was done and was benign     Was seen in Feb and at that time was severely hypothyorid so methimazole was held and restarted on 3/12 on 5 mg daily No blood work after  Energy: Good  Sleep: Difficulty falling asleep, sleeping around 5 hours  Weight: Stable  BM: 2 BM per day  Cold or heat intolerance: No  Palpitations or tremors: Had palpitations a month ago No tremors  Menses or HF or NS: No periods for 2 years. Hot flashes every 2 weeks (Improved from before). No night sweats  Skin or hair changes: Hair and nails growing  Cramps: No   Tingling numbness: No  Compressive symptoms:  - Hoarseness: No  - SOB while lying flat: No  - Dysphagia: No  Has not been taking vitamin D  Hasn't been taking her BP meds    Eyes tearing a lot   Smokes weed  Puffy eyes in am  No redness   Itchiness  Eyes bulging more   Latest Reference Range & Units 09/27/23 08:33 10/30/23 13:25 02/19/24  "11:14 03/05/24 11:03   Thyroxine, Free 0.78 - 1.48 ng/dL  0.98 0.20 (L) 0.84   Triiodothyronine 60 - 200 ng/dL   45 (L) 133   Thyroid Stimulating Hormone 0.44 - 3.98 mIU/L <0.01 (L) <0.01 (L) 58.49 (H) 1.50   GLUCOSE 74 - 99 mg/dL  77 87 96   (L): Data is abnormally low  (H): Data is abnormally high  Review of Systems  all pertinent systems reviewed and are otherwise negative   Objective   Visit Vitals  BP (!) 149/94 Comment: Dr. bravo   Pulse 65   Temp 36.5 °C (97.7 °F)   Ht 1.651 m (5' 5\")   Wt 75.9 kg (167 lb 6.4 oz)   BMI 27.86 kg/m²   OB Status Menopausal   Smoking Status Never   BSA 1.87 m²      Physical Exam  Constitutional:       General: She is not in acute distress.     Appearance: Normal appearance.   Eyes:      Extraocular Movements: Extraocular movements intact.      Pupils: Pupils are equal, round, and reactive to light.   Neck:      Comments: Enlarged, multiple 1 cm nodules felt bilaterally  Cardiovascular:      Rate and Rhythm: Normal rate and regular rhythm.   Pulmonary:      Effort: Pulmonary effort is normal. No respiratory distress.      Breath sounds: Normal breath sounds.   Abdominal:      General: Bowel sounds are normal.      Palpations: Abdomen is soft.      Tenderness: There is no abdominal tenderness.   Skin:     Coloration: Skin is not jaundiced or pale.      Findings: No erythema or rash.   Neurological:      General: No focal deficit present.      Mental Status: She is alert and oriented to person, place, and time.      Deep Tendon Reflexes: Reflexes normal.   Psychiatric:         Mood and Affect: Mood normal.         Behavior: Behavior normal.        Lab Results   Component Value Date    TSH 1.50 03/05/2024    FREET4 0.84 03/05/2024       Assessment/Plan   Ely Melgar is a 48 y.o. female who I was asked to see in consultation for evaluation of hyperthyroidism.    Patient diagnosed with Graves diasease around August and was started on methimazole 10 mg twice daily  Initial blood " work showed a TSH 0.03 in January 2023 with Ft4: 1.02  Repeated in August 2023 TSH < 0.01 and FT4: 1.77  US thyroid in August:   Right-side:   2 cm solid, hypoechoic nodule the right thyroid midpole compatible a TIRADS 4 nodule.  1.8 cm solid, hypoechoic nodule the right thyroid lower pole compatible a TIRADS 4 nodule.  0.8 cm solid, hypoechoic nodule the right thyroid upper pole compatible a TIRADS 4 nodule.     Left side:   2 separate 1 cm upper pole and lower pole solid, hypoechoic nodules compatible with TI-RADS 4 nodules each.  0.7 cm upper pole nodule which also appears solid and hypoechoic compatible a TIRADS 4 nodule.  Repeat blood work in Sept TSH < 0.01  and FT4: 1.28 continued on methimazole 10 mg BID. TSI 3.9 and TRAB 11.9  Saw Dr. Robledo and thyroid nodule bx was done and was benign     Was seen in Feb and at that time was severely hypothyorid so methimazole was held and restarted on 3/12 on 5 mg daily No blood work after  Energy: Good  Palpitations or tremors: Had palpitations a month ago No tremors  Eyes tearing a lot   Smokes weed\  Clinically hypothyroid  Plan  Continue methimazole 5 mg daily.  Blood work today and we will decide on methimazole dosage.  Ultrasound thyroid  Neuroophthalm referral  Eye precautions discussed    Restart vitamin D 50,000 weekly  We sent prescription for blood pressure medication  PCP suggestions Dr. Mumtaz Bruno and Dr. Emely Chase    RTC in 3 months    At least 35 minutes were spent reviewing the patient's chart as well as discussing and  reviewing the plan including educating and answering questions and concerns, greater than 50 percent spent in counseling and coordination of care.

## 2024-07-18 NOTE — PATIENT INSTRUCTIONS
Continue methimazole 5 mg daily.  Blood work today and we will decide on methimazole dosage.  Ultrasound thyroid  We referred you to a thyroid eye specialist  For your eyes:  Avoid smoking  Eye drops (Natural tears) 3-4 times a day  Elevate bed 30 degrees  Avoid salt  Wear glasses every time you go out of the house     Restart vitamin D 50,000 weekly  We sent prescription for blood pressure medication  PCP suggestions Dr. Mumtaz Bruno and Dr. Emely Chase    RTC in 3 months

## 2024-07-19 ENCOUNTER — TELEPHONE (OUTPATIENT)
Dept: ENDOCRINOLOGY | Facility: HOSPITAL | Age: 49
End: 2024-07-19
Payer: COMMERCIAL

## 2024-07-19 DIAGNOSIS — E05.90 HYPERTHYROIDISM: Primary | ICD-10-CM

## 2024-07-19 NOTE — TELEPHONE ENCOUNTER
Spoke with patient to give attached message from provider. No questions or concerns raised at the time. Patient verbalized understanding of results.     Tiffany Farmer RN   ----- Message from Radha Trammell sent at 7/19/2024 10:33 AM EDT -----  Please ask patient to hold methimazole for 1 week and then resume 2.5 mg daily. Blood work in 4 weeks

## 2024-07-24 ENCOUNTER — APPOINTMENT (OUTPATIENT)
Dept: ENDOCRINOLOGY | Facility: HOSPITAL | Age: 49
End: 2024-07-24
Payer: COMMERCIAL

## 2024-07-25 LAB — TSI SER-ACNC: 2.9 TSI INDEX

## 2024-09-30 ENCOUNTER — APPOINTMENT (OUTPATIENT)
Dept: OPHTHALMOLOGY | Facility: CLINIC | Age: 49
End: 2024-09-30
Payer: COMMERCIAL

## 2024-09-30 DIAGNOSIS — E05.00 GRAVES DISEASE: Primary | ICD-10-CM

## 2024-09-30 DIAGNOSIS — H57.89 THYROID EYE DISEASE: ICD-10-CM

## 2024-09-30 DIAGNOSIS — E07.9 THYROID EYE DISEASE: ICD-10-CM

## 2024-09-30 DIAGNOSIS — E05.90 HYPERTHYROIDISM: ICD-10-CM

## 2024-09-30 PROCEDURE — 92133 CPTRZD OPH DX IMG PST SGM ON: CPT | Performed by: PSYCHIATRY & NEUROLOGY

## 2024-09-30 PROCEDURE — 99205 OFFICE O/P NEW HI 60 MIN: CPT | Performed by: PSYCHIATRY & NEUROLOGY

## 2024-09-30 PROCEDURE — 92083 EXTENDED VISUAL FIELD XM: CPT | Performed by: PSYCHIATRY & NEUROLOGY

## 2024-09-30 ASSESSMENT — CONF VISUAL FIELD
OD_SUPERIOR_NASAL_RESTRICTION: 0
OS_SUPERIOR_TEMPORAL_RESTRICTION: 0
OS_INFERIOR_NASAL_RESTRICTION: 0
OD_INFERIOR_TEMPORAL_RESTRICTION: 0
OD_SUPERIOR_TEMPORAL_RESTRICTION: 0
METHOD: COUNTING FINGERS
OD_INFERIOR_NASAL_RESTRICTION: 0
OS_NORMAL: 1
OD_NORMAL: 1
OS_INFERIOR_TEMPORAL_RESTRICTION: 0
OS_SUPERIOR_NASAL_RESTRICTION: 0

## 2024-09-30 ASSESSMENT — VISUAL ACUITY
OD_SC+: -2
OD_SC: 20/40
OS_PH_SC: 20/20
OS_SC: 20/70
METHOD: SNELLEN - LINEAR
OD_PH_SC+: -2
OS_PH_SC+: +1
OD_PH_SC: 20/20

## 2024-09-30 ASSESSMENT — ENCOUNTER SYMPTOMS
NEUROLOGICAL NEGATIVE: 0
ENDOCRINE NEGATIVE: 0
CARDIOVASCULAR NEGATIVE: 0
MUSCULOSKELETAL NEGATIVE: 0
EYES NEGATIVE: 1
PSYCHIATRIC NEGATIVE: 0
HEMATOLOGIC/LYMPHATIC NEGATIVE: 0
GASTROINTESTINAL NEGATIVE: 0
RESPIRATORY NEGATIVE: 0
CONSTITUTIONAL NEGATIVE: 0
ALLERGIC/IMMUNOLOGIC NEGATIVE: 0

## 2024-09-30 ASSESSMENT — SLIT LAMP EXAM - LIDS: COMMENTS: TRACE INFERIOR SCLERAL SHOW

## 2024-09-30 ASSESSMENT — PACHYMETRY
OD_CT(UM): 13
OS_CT(UM): 11

## 2024-09-30 NOTE — PROGRESS NOTES
Assessment and Plan    09/30/2024 Andrzej OD 27 & OS 24 at base 94.    Lab Results   Component Value Date/Time    [id number] 639 01/06/2023 1044    LVPYAFFM10 390 09/15/2021 0000     Thyroid eye disease studies  Lab Results   Component Value Date/Time    RECE 11.90 (H) 09/27/2023 0833    TSI 2.9 (H) 07/18/2024 1005    TSI 2.4 (H) 02/19/2024 1114    TSI 3.9 (H) 09/27/2023 0833      09/30/2024 OCT RNFL OD 99 & OS 99.    09/30/2024 HVF 24-2 OD fovea 40, FL 15/17, FP 10%, FN 13%, low reliability, possible superior arcuate MD -4.91 & OS fovea 35, wnl MD -0.95.    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 However, I do not see clear evidence of optic neuropathy. She has some visual field defects of the right eye on Fontenot visual field (HVF), but they likely are first time testing artifacts. We discussed thyroid function control and artificial tears as treatment now with future options of intravenous methylprednisolone, teprotumumab and surgical correction depending on the course of disease.    Plan    Check CT orbits without contrast.  Thyroid function control.  Use artificial tears up to 3-4 times per day.  No smoking although role of cannabis is not as clear as tobacco.    Follow up in 3-4 months with Fontenot visual field (HVF) (OCT in longer term). (Dilated 9/30/2024).

## 2024-10-22 ENCOUNTER — LAB (OUTPATIENT)
Dept: LAB | Facility: LAB | Age: 49
End: 2024-10-22
Payer: COMMERCIAL

## 2024-10-22 ENCOUNTER — APPOINTMENT (OUTPATIENT)
Dept: ENDOCRINOLOGY | Facility: CLINIC | Age: 49
End: 2024-10-22
Payer: COMMERCIAL

## 2024-10-22 ENCOUNTER — APPOINTMENT (OUTPATIENT)
Dept: PRIMARY CARE | Facility: CLINIC | Age: 49
End: 2024-10-22
Payer: COMMERCIAL

## 2024-10-22 VITALS
HEART RATE: 72 BPM | WEIGHT: 166.2 LBS | BODY MASS INDEX: 27.66 KG/M2 | TEMPERATURE: 96.8 F | SYSTOLIC BLOOD PRESSURE: 140 MMHG | DIASTOLIC BLOOD PRESSURE: 90 MMHG

## 2024-10-22 DIAGNOSIS — E05.00 GRAVES DISEASE: ICD-10-CM

## 2024-10-22 DIAGNOSIS — I10 HYPERTENSION, UNSPECIFIED TYPE: ICD-10-CM

## 2024-10-22 DIAGNOSIS — E05.90 HYPERTHYROIDISM: ICD-10-CM

## 2024-10-22 DIAGNOSIS — E55.9 VITAMIN D DEFICIENCY: ICD-10-CM

## 2024-10-22 LAB
ALBUMIN SERPL BCP-MCNC: 4.4 G/DL (ref 3.4–5)
ALP SERPL-CCNC: 80 U/L (ref 33–110)
ALT SERPL W P-5'-P-CCNC: 15 U/L (ref 7–45)
ANION GAP SERPL CALC-SCNC: 14 MMOL/L (ref 10–20)
AST SERPL W P-5'-P-CCNC: 20 U/L (ref 9–39)
BILIRUB SERPL-MCNC: 0.7 MG/DL (ref 0–1.2)
BUN SERPL-MCNC: 9 MG/DL (ref 6–23)
CALCIUM SERPL-MCNC: 9.9 MG/DL (ref 8.6–10.6)
CHLORIDE SERPL-SCNC: 104 MMOL/L (ref 98–107)
CO2 SERPL-SCNC: 27 MMOL/L (ref 21–32)
CREAT SERPL-MCNC: 0.8 MG/DL (ref 0.5–1.05)
EGFRCR SERPLBLD CKD-EPI 2021: 90 ML/MIN/1.73M*2
GLUCOSE SERPL-MCNC: 110 MG/DL (ref 74–99)
POTASSIUM SERPL-SCNC: 4.1 MMOL/L (ref 3.5–5.3)
PROT SERPL-MCNC: 7.4 G/DL (ref 6.4–8.2)
SODIUM SERPL-SCNC: 141 MMOL/L (ref 136–145)
T3 SERPL-MCNC: 128 NG/DL (ref 60–200)
T4 FREE SERPL-MCNC: 1.04 NG/DL (ref 0.78–1.48)
TSH SERPL-ACNC: 2.46 MIU/L (ref 0.44–3.98)

## 2024-10-22 PROCEDURE — 80053 COMPREHEN METABOLIC PANEL: CPT

## 2024-10-22 PROCEDURE — 84439 ASSAY OF FREE THYROXINE: CPT

## 2024-10-22 PROCEDURE — 99213 OFFICE O/P EST LOW 20 MIN: CPT | Performed by: STUDENT IN AN ORGANIZED HEALTH CARE EDUCATION/TRAINING PROGRAM

## 2024-10-22 PROCEDURE — 84445 ASSAY OF TSI GLOBULIN: CPT

## 2024-10-22 PROCEDURE — 36415 COLL VENOUS BLD VENIPUNCTURE: CPT

## 2024-10-22 PROCEDURE — 84443 ASSAY THYROID STIM HORMONE: CPT

## 2024-10-22 PROCEDURE — 84480 ASSAY TRIIODOTHYRONINE (T3): CPT

## 2024-10-22 PROCEDURE — 3080F DIAST BP >= 90 MM HG: CPT | Performed by: STUDENT IN AN ORGANIZED HEALTH CARE EDUCATION/TRAINING PROGRAM

## 2024-10-22 PROCEDURE — 3077F SYST BP >= 140 MM HG: CPT | Performed by: STUDENT IN AN ORGANIZED HEALTH CARE EDUCATION/TRAINING PROGRAM

## 2024-10-22 RX ORDER — VALSARTAN AND HYDROCHLOROTHIAZIDE 160; 12.5 MG/1; MG/1
1 TABLET, FILM COATED ORAL DAILY
Qty: 30 TABLET | Refills: 0 | Status: SHIPPED | OUTPATIENT
Start: 2024-10-22

## 2024-10-22 RX ORDER — ERGOCALCIFEROL 1.25 MG/1
CAPSULE ORAL
Qty: 13 CAPSULE | Refills: 1 | Status: SHIPPED | OUTPATIENT
Start: 2024-10-22

## 2024-10-22 RX ORDER — METHIMAZOLE 5 MG/1
5 TABLET ORAL DAILY
Qty: 30 TABLET | Refills: 11 | Status: SHIPPED | OUTPATIENT
Start: 2024-10-22 | End: 2025-10-22

## 2024-10-22 ASSESSMENT — PAIN SCALES - GENERAL: PAINLEVEL_OUTOF10: 0-NO PAIN

## 2024-10-22 NOTE — PROGRESS NOTES
Subjective   Ely Melgar is a 49 y.o. female with hx of Graves disease coming for follow up  Patient diagnosed with Graves diasease around August and was started on methimazole 10 mg twice daily  Initial blood work showed a TSH 0.03 in January 2023 with Ft4: 1.02  Repeated in August 2023 TSH < 0.01 and FT4: 1.77  US thyroid in August:   Right-side:   2 cm solid, hypoechoic nodule the right thyroid midpole compatible a TIRADS 4 nodule.  1.8 cm solid, hypoechoic nodule the right thyroid lower pole compatible a TIRADS 4 nodule.  0.8 cm solid, hypoechoic nodule the right thyroid upper pole compatible a TIRADS 4 nodule.     Left side:   2 separate 1 cm upper pole and lower pole solid, hypoechoic nodules compatible with TI-RADS 4 nodules each.  0.7 cm upper pole nodule which also appears solid and hypoechoic compatible a TIRADS 4 nodule.  Repeat blood work in Sept TSH < 0.01  and FT4: 1.28 continued on methimazole 10 mg BID. TSI 3.9 and TRAB 11.9  Saw Dr. Robledo and thyroid nodule bx was done and was benign     Was seen in Feb and at that time was severely hypothyorid so methimazole was held and restarted on 3/12 on 5 mg daily   Palpitations or tremors: Had palpitations a month ago No tremors  Eyes tearing a lot   Smokes weed Not every day    Slept only 4 hours   Sometimes vision is blurry  Double vision sometimes on top of each other  Using eye drops occasionally  Tearing a lot    Energy: okay better than before. Except for today tired  Sleep: Off work at 10:45 pm sleeps sometimes at 2-4 am and then wakes up at 11am  Weight: Stable  Hot flashes everyday  No night sweats  Menopause 1-2 years ago  No constipation  No palpitations  No tremors    On vitamin D 50,000IU  Review of Systems  all pertinent systems reviewed and are otherwise negative   Objective   /90 (BP Location: Right arm, Patient Position: Sitting, BP Cuff Size: Adult)   Pulse 72   Temp 36 °C (96.8 °F)   Wt 75.4 kg (166 lb 3.2 oz)   BMI 27.66  kg/m²   Physical Exam  Constitutional:       General: She is not in acute distress.     Appearance: Normal appearance.   Eyes:      Extraocular Movements: Extraocular movements intact.      Pupils: Pupils are equal, round, and reactive to light.   Neck:      Comments: ~ 30 g rubbery  Cardiovascular:      Rate and Rhythm: Normal rate and regular rhythm.   Pulmonary:      Effort: Pulmonary effort is normal. No respiratory distress.      Breath sounds: Normal breath sounds.   Abdominal:      General: Bowel sounds are normal.      Palpations: Abdomen is soft.      Tenderness: There is no abdominal tenderness.   Skin:     Coloration: Skin is not jaundiced or pale.      Findings: No erythema or rash.   Neurological:      General: No focal deficit present.      Mental Status: She is alert and oriented to person, place, and time.      Deep Tendon Reflexes: Reflexes normal.   Psychiatric:         Mood and Affect: Mood normal.         Behavior: Behavior normal.          Lab Results   Component Value Date    TSH 7.53 (H) 07/18/2024    FREET4 0.92 07/18/2024       Assessment/Plan   Ely Melgar is a 49 y.o. female with hx of Graves disease coming for follow up  Patient diagnosed with Graves diasease around August 2023 and was started on methimazole 10 mg twice daily  Initial blood work showed a TSH 0.03 in January 2023 with Ft4: 1.02  Repeated in August 2023 TSH < 0.01 and FT4: 1.77  US thyroid in August:   Right-side:   2 cm solid, hypoechoic nodule the right thyroid midpole compatible a TIRADS 4 nodule.  1.8 cm solid, hypoechoic nodule the right thyroid lower pole compatible a TIRADS 4 nodule.  0.8 cm solid, hypoechoic nodule the right thyroid upper pole compatible a TIRADS 4 nodule.  Left side:   2 separate 1 cm upper pole and lower pole solid, hypoechoic nodules compatible with TI-RADS 4 nodules each.  0.7 cm upper pole nodule which also appears solid and hypoechoic compatible a TIRADS 4 nodule.  Repeat blood work in  Sept TSH < 0.01  and FT4: 1.28 continued on methimazole 10 mg BID. TSI 3.9 and TRAB 11.9  Saw Dr. Robledo and thyroid nodule bx was done and was benign     Was seen in Feb and at that time was severely hypothyorid so methimazole was held and restarted on 3/12 on 5 mg daily  In July TSH was 7 so methimazole was held for a week and restarted on 2.5 mg once weekly  Palpitations or tremors: Had palpitations a month ago No tremors  Eyes tearing a lot   Smokes weed Not every day  Slept only 4 hours   Sometimes vision is blurry  Double vision sometimes on top of each other  Using eye drops occasionally  Tearing a lot    Energy: okay better than before. Except for today tired  Weight: Stable  Hot flashes everyday  No night sweats  Menopause 1-2 years ago    On vitamin D 50,000IU  Plan  Continue methimazole 2.5 mg daily.  Blood work today and we will decide on methimazole dosage.  Eye precautions discussed   US thyroid   Continue vitamin D 50,000 weekly   RTC in 3 months

## 2024-10-28 ENCOUNTER — APPOINTMENT (OUTPATIENT)
Dept: PRIMARY CARE | Facility: CLINIC | Age: 49
End: 2024-10-28
Payer: COMMERCIAL

## 2024-11-05 ENCOUNTER — APPOINTMENT (OUTPATIENT)
Dept: RADIOLOGY | Facility: HOSPITAL | Age: 49
End: 2024-11-05
Payer: COMMERCIAL

## 2024-11-06 ENCOUNTER — TELEPHONE (OUTPATIENT)
Dept: ENDOCRINOLOGY | Facility: CLINIC | Age: 49
End: 2024-11-06
Payer: COMMERCIAL

## 2024-11-06 NOTE — TELEPHONE ENCOUNTER
Left voicemail to give attached message from provider. Office number was given for a call back in case of any questions or concerns.    Tiffany Farmer RN   ----- Message from Radha Trammell sent at 11/5/2024 11:58 PM EST -----  Please inform patient we reviewed blood work. TSH improving. Ab still pending.  At this time we will continue same and repeat in 2 months

## 2024-11-13 LAB — TSI SER-ACNC: 2.7 TSI INDEX

## 2024-11-19 ENCOUNTER — APPOINTMENT (OUTPATIENT)
Dept: PRIMARY CARE | Facility: CLINIC | Age: 49
End: 2024-11-19
Payer: COMMERCIAL

## 2024-11-19 DIAGNOSIS — Z12.11 ENCOUNTER FOR SCREENING FOR MALIGNANT NEOPLASM OF COLON: ICD-10-CM

## 2024-11-19 DIAGNOSIS — E53.8 VITAMIN B12 DEFICIENCY: ICD-10-CM

## 2024-11-19 DIAGNOSIS — E55.9 VITAMIN D DEFICIENCY: ICD-10-CM

## 2024-11-19 DIAGNOSIS — Z12.31 ENCOUNTER FOR SCREENING MAMMOGRAM FOR MALIGNANT NEOPLASM OF BREAST: ICD-10-CM

## 2024-11-19 DIAGNOSIS — Z13.1 DIABETES MELLITUS SCREENING: ICD-10-CM

## 2024-11-19 DIAGNOSIS — Z76.89 ENCOUNTER TO ESTABLISH CARE: Primary | ICD-10-CM

## 2024-11-19 DIAGNOSIS — I10 HYPERTENSION, UNSPECIFIED TYPE: ICD-10-CM

## 2024-11-19 DIAGNOSIS — I10 BENIGN HYPERTENSION: ICD-10-CM

## 2024-11-19 DIAGNOSIS — E78.5 DYSLIPIDEMIA: ICD-10-CM

## 2024-11-19 PROCEDURE — 99204 OFFICE O/P NEW MOD 45 MIN: CPT | Performed by: PHYSICIAN ASSISTANT

## 2024-11-19 PROCEDURE — 1036F TOBACCO NON-USER: CPT | Performed by: PHYSICIAN ASSISTANT

## 2024-11-19 PROCEDURE — 3008F BODY MASS INDEX DOCD: CPT | Performed by: PHYSICIAN ASSISTANT

## 2024-11-19 PROCEDURE — 3078F DIAST BP <80 MM HG: CPT | Performed by: PHYSICIAN ASSISTANT

## 2024-11-19 PROCEDURE — 3074F SYST BP LT 130 MM HG: CPT | Performed by: PHYSICIAN ASSISTANT

## 2024-11-19 RX ORDER — VALSARTAN AND HYDROCHLOROTHIAZIDE 160; 12.5 MG/1; MG/1
1 TABLET, FILM COATED ORAL DAILY
Qty: 90 TABLET | Refills: 0 | Status: SHIPPED | OUTPATIENT
Start: 2024-11-19

## 2024-11-19 ASSESSMENT — ENCOUNTER SYMPTOMS
FATIGUE: 0
JOINT SWELLING: 0
SHORTNESS OF BREATH: 0
DIZZINESS: 0
FEVER: 0
PALPITATIONS: 0
COLOR CHANGE: 0
WEAKNESS: 0
ABDOMINAL PAIN: 0
HEADACHES: 0
CONFUSION: 0
DIFFICULTY URINATING: 0
COUGH: 0
FACIAL SWELLING: 0
SLEEP DISTURBANCE: 0
MYALGIAS: 0
POLYPHAGIA: 0
LOSS OF SENSATION IN FEET: 0
POLYDIPSIA: 0
FREQUENCY: 0
CHILLS: 0
OCCASIONAL FEELINGS OF UNSTEADINESS: 0
NERVOUS/ANXIOUS: 0
ABDOMINAL DISTENTION: 0
SORE THROAT: 0
TREMORS: 0
APPETITE CHANGE: 0
CHOKING: 0
HEMATURIA: 0
NAUSEA: 0
DIARRHEA: 0
CONSTIPATION: 0
CHEST TIGHTNESS: 0
ANAL BLEEDING: 0
NUMBNESS: 0
EYE PAIN: 0
VOMITING: 0
WHEEZING: 0
ARTHRALGIAS: 0
DEPRESSION: 0
EYE DISCHARGE: 0

## 2024-11-19 ASSESSMENT — PATIENT HEALTH QUESTIONNAIRE - PHQ9
2. FEELING DOWN, DEPRESSED OR HOPELESS: NOT AT ALL
SUM OF ALL RESPONSES TO PHQ9 QUESTIONS 1 AND 2: 0
1. LITTLE INTEREST OR PLEASURE IN DOING THINGS: NOT AT ALL

## 2024-11-19 NOTE — PROGRESS NOTES
"Subjective   Patient ID: Ely Melgar is a 49 y.o. female with a history of HTN and Graves disease who presents for New Patient Visit.    HPI the patient is presented to become established as a new patient with a primary care provider. She does not monitor her blood pressure at home but takes her medications as prescribed.   She follows with endocrinology regularly.  She had had colonoscopy or mammogram.  Today she denies shortness of breath, chest pain, fever, chills, abdominal pain, nausea, vomiting, constipation, or diarrhea.  No headaches or dizziness.    Review of Systems   Constitutional:  Negative for appetite change, chills, fatigue and fever.   HENT:  Negative for congestion, ear pain, facial swelling, hearing loss, nosebleeds and sore throat.    Eyes:  Negative for pain, discharge and visual disturbance.   Respiratory:  Negative for cough, choking, chest tightness, shortness of breath and wheezing.    Cardiovascular:  Negative for chest pain, palpitations and leg swelling.   Gastrointestinal:  Negative for abdominal distention, abdominal pain, anal bleeding, constipation, diarrhea, nausea and vomiting.   Endocrine: Negative for cold intolerance, heat intolerance, polydipsia, polyphagia and polyuria.   Genitourinary:  Negative for difficulty urinating, frequency, hematuria and urgency.   Musculoskeletal:  Negative for arthralgias, gait problem, joint swelling and myalgias.   Skin:  Negative for color change and rash.   Neurological:  Negative for dizziness, tremors, syncope, weakness, numbness and headaches.   Psychiatric/Behavioral:  Negative for behavioral problems, confusion, sleep disturbance and suicidal ideas. The patient is not nervous/anxious.        Objective   /78   Temp 36.3 °C (97.3 °F)   Ht 1.6 m (5' 3\")   Wt 77.1 kg (170 lb)   BMI 30.11 kg/m²     Physical Exam  Constitutional:       General: She is not in acute distress.     Appearance: Normal appearance.   HENT:      Head: " Normocephalic and atraumatic.      Nose: Nose normal.   Eyes:      Extraocular Movements: Extraocular movements intact.      Conjunctiva/sclera: Conjunctivae normal.      Pupils: Pupils are equal, round, and reactive to light.   Cardiovascular:      Rate and Rhythm: Normal rate and regular rhythm.      Pulses: Normal pulses.      Heart sounds: Normal heart sounds.   Pulmonary:      Effort: Pulmonary effort is normal.      Breath sounds: Normal breath sounds.   Abdominal:      General: Bowel sounds are normal.      Palpations: Abdomen is soft.   Musculoskeletal:         General: Normal range of motion.      Cervical back: Normal range of motion and neck supple.   Neurological:      General: No focal deficit present.      Mental Status: She is alert and oriented to person, place, and time.   Psychiatric:         Mood and Affect: Mood normal.         Behavior: Behavior normal.         Thought Content: Thought content normal.         Judgment: Judgment normal.         Assessment/Plan     Hypertension  Blood pressure well controlled  low sodium diet, DASH or Mediterranean diet reccommended   Try to exercise 30 minutes daily  Continue valsartan-HCTZ 160-12.5 mg daily    Graves disease  Stable  Follow-up with endocrinology    Body mass index is 30.11 kg/m².  Morbid obesity  Elevated BMI, ideal BMI is between 18.5-24.9 kg/m2  Weight loss recommended  Low fat, low cholesterol diet, low carbohydrate diet  Exercise at least 30 minutes daily    Health maintenance  Mammogram and Cologuard orders are placed  Vaccinations declined    Requisition for blood work is given to the patient    Follow-up in 3-4 months or sooner if needed

## 2024-11-21 PROBLEM — Z12.31 ENCOUNTER FOR SCREENING MAMMOGRAM FOR MALIGNANT NEOPLASM OF BREAST: Status: ACTIVE | Noted: 2024-04-02

## 2024-11-21 PROBLEM — Z12.11 ENCOUNTER FOR SCREENING FOR MALIGNANT NEOPLASM OF COLON: Status: ACTIVE | Noted: 2024-04-02

## 2024-11-21 PROBLEM — Z76.89 ENCOUNTER TO ESTABLISH CARE: Status: ACTIVE | Noted: 2024-04-02

## 2024-11-21 PROBLEM — Z13.1 DIABETES MELLITUS SCREENING: Status: ACTIVE | Noted: 2024-04-02

## 2024-11-22 ENCOUNTER — LAB (OUTPATIENT)
Dept: LAB | Facility: LAB | Age: 49
End: 2024-11-22
Payer: COMMERCIAL

## 2024-11-22 ENCOUNTER — HOSPITAL ENCOUNTER (OUTPATIENT)
Dept: RADIOLOGY | Facility: HOSPITAL | Age: 49
Discharge: HOME | End: 2024-11-22
Payer: COMMERCIAL

## 2024-11-22 VITALS
SYSTOLIC BLOOD PRESSURE: 120 MMHG | WEIGHT: 170 LBS | TEMPERATURE: 97.3 F | DIASTOLIC BLOOD PRESSURE: 78 MMHG | HEIGHT: 63 IN | BODY MASS INDEX: 30.12 KG/M2

## 2024-11-22 DIAGNOSIS — E53.8 VITAMIN B12 DEFICIENCY: ICD-10-CM

## 2024-11-22 DIAGNOSIS — E55.9 VITAMIN D DEFICIENCY: ICD-10-CM

## 2024-11-22 DIAGNOSIS — I10 BENIGN HYPERTENSION: ICD-10-CM

## 2024-11-22 DIAGNOSIS — E78.5 DYSLIPIDEMIA: ICD-10-CM

## 2024-11-22 DIAGNOSIS — E07.9 THYROID EYE DISEASE: ICD-10-CM

## 2024-11-22 DIAGNOSIS — H57.89 THYROID EYE DISEASE: ICD-10-CM

## 2024-11-22 DIAGNOSIS — Z13.1 DIABETES MELLITUS SCREENING: ICD-10-CM

## 2024-11-22 DIAGNOSIS — E05.90 HYPERTHYROIDISM: ICD-10-CM

## 2024-11-22 LAB
25(OH)D3 SERPL-MCNC: 36 NG/ML (ref 30–100)
CHOLEST SERPL-MCNC: 209 MG/DL (ref 0–199)
CHOLESTEROL/HDL RATIO: 3.4
EST. AVERAGE GLUCOSE BLD GHB EST-MCNC: 117 MG/DL
HBA1C MFR BLD: 5.7 %
HDLC SERPL-MCNC: 61.2 MG/DL
LDLC SERPL CALC-MCNC: 132 MG/DL
NON HDL CHOLESTEROL: 148 MG/DL (ref 0–149)
TRIGL SERPL-MCNC: 78 MG/DL (ref 0–149)
VIT B12 SERPL-MCNC: 636 PG/ML (ref 211–911)
VLDL: 16 MG/DL (ref 0–40)

## 2024-11-22 PROCEDURE — 70480 CT ORBIT/EAR/FOSSA W/O DYE: CPT

## 2024-11-22 PROCEDURE — 82607 VITAMIN B-12: CPT

## 2024-11-22 PROCEDURE — 36415 COLL VENOUS BLD VENIPUNCTURE: CPT

## 2024-11-22 PROCEDURE — 80061 LIPID PANEL: CPT

## 2024-11-22 PROCEDURE — 82306 VITAMIN D 25 HYDROXY: CPT

## 2024-11-22 PROCEDURE — 76536 US EXAM OF HEAD AND NECK: CPT

## 2024-11-22 PROCEDURE — 83036 HEMOGLOBIN GLYCOSYLATED A1C: CPT

## 2024-11-25 ENCOUNTER — TELEPHONE (OUTPATIENT)
Dept: PRIMARY CARE | Facility: CLINIC | Age: 49
End: 2024-11-25
Payer: COMMERCIAL

## 2024-11-25 NOTE — TELEPHONE ENCOUNTER
----- Message from Rut Broderick sent at 11/25/2024 12:53 PM EST -----  Please call with results.  Her hemoglobin A1c which is 3 months sugar had improved since last year.  The rest of the blood work is pretty good.  Slightly elevated LDL but I think she was not fasting.  Vitamin D and vitamin B12 are within normal limits.

## 2024-12-02 ENCOUNTER — HOSPITAL ENCOUNTER (OUTPATIENT)
Dept: RADIOLOGY | Facility: CLINIC | Age: 49
Discharge: HOME | End: 2024-12-02
Payer: COMMERCIAL

## 2024-12-02 DIAGNOSIS — Z12.31 ENCOUNTER FOR SCREENING MAMMOGRAM FOR MALIGNANT NEOPLASM OF BREAST: ICD-10-CM

## 2024-12-02 DIAGNOSIS — R92.8 OTHER ABNORMAL AND INCONCLUSIVE FINDINGS ON DIAGNOSTIC IMAGING OF BREAST: ICD-10-CM

## 2024-12-02 PROCEDURE — 77067 SCR MAMMO BI INCL CAD: CPT

## 2024-12-02 PROCEDURE — 77067 SCR MAMMO BI INCL CAD: CPT | Performed by: RADIOLOGY

## 2024-12-02 PROCEDURE — 77063 BREAST TOMOSYNTHESIS BI: CPT | Performed by: RADIOLOGY

## 2024-12-05 ENCOUNTER — TELEPHONE (OUTPATIENT)
Dept: PRIMARY CARE | Facility: CLINIC | Age: 49
End: 2024-12-05
Payer: COMMERCIAL

## 2024-12-05 NOTE — TELEPHONE ENCOUNTER
----- Message from Rut Broderick sent at 12/5/2024 12:04 PM EST -----  Negative mammogram.  Repeat in 1 year

## 2024-12-09 ENCOUNTER — TELEPHONE (OUTPATIENT)
Dept: PRIMARY CARE | Facility: CLINIC | Age: 49
End: 2024-12-09
Payer: COMMERCIAL

## 2024-12-09 NOTE — TELEPHONE ENCOUNTER
----- Message from Rut Broderick sent at 12/5/2024  1:02 PM EST -----  Negative mammogram.  Repeat in 1 year

## 2024-12-12 ENCOUNTER — TELEPHONE (OUTPATIENT)
Dept: PRIMARY CARE | Facility: CLINIC | Age: 49
End: 2024-12-12
Payer: COMMERCIAL

## 2025-01-06 ENCOUNTER — TELEMEDICINE (OUTPATIENT)
Dept: PRIMARY CARE | Facility: CLINIC | Age: 50
End: 2025-01-06
Payer: COMMERCIAL

## 2025-01-06 DIAGNOSIS — R09.82 POST-NASAL DRAINAGE: ICD-10-CM

## 2025-01-06 DIAGNOSIS — J01.00 ACUTE NON-RECURRENT MAXILLARY SINUSITIS: Primary | ICD-10-CM

## 2025-01-06 PROCEDURE — 99212 OFFICE O/P EST SF 10 MIN: CPT | Performed by: PHYSICIAN ASSISTANT

## 2025-01-06 PROCEDURE — 1036F TOBACCO NON-USER: CPT | Performed by: PHYSICIAN ASSISTANT

## 2025-01-06 RX ORDER — FLUTICASONE PROPIONATE 50 MCG
2 SPRAY, SUSPENSION (ML) NASAL DAILY
Qty: 16 G | Refills: 0 | Status: SHIPPED | OUTPATIENT
Start: 2025-01-06 | End: 2026-01-06

## 2025-01-06 RX ORDER — AZITHROMYCIN 250 MG/1
TABLET, FILM COATED ORAL
Qty: 6 TABLET | Refills: 0 | Status: SHIPPED | OUTPATIENT
Start: 2025-01-06 | End: 2025-01-11

## 2025-01-06 ASSESSMENT — ENCOUNTER SYMPTOMS
MYALGIAS: 0
CHOKING: 0
CHEST TIGHTNESS: 0
WEAKNESS: 0
APPETITE CHANGE: 0
FEVER: 0
NUMBNESS: 0
SHORTNESS OF BREATH: 0
DIZZINESS: 0
FACIAL SWELLING: 0
PALPITATIONS: 0
DIARRHEA: 0
JOINT SWELLING: 0
VOMITING: 0
POLYDIPSIA: 0
EYE DISCHARGE: 0
HEMATURIA: 0
POLYPHAGIA: 0
NAUSEA: 0
COUGH: 1
CONSTIPATION: 0
COLOR CHANGE: 0
HEADACHES: 0
ABDOMINAL DISTENTION: 0
FATIGUE: 0
FREQUENCY: 0
CONFUSION: 0
NERVOUS/ANXIOUS: 0
ARTHRALGIAS: 0
WHEEZING: 0
DIFFICULTY URINATING: 0
EYE PAIN: 0
ANAL BLEEDING: 0
SLEEP DISTURBANCE: 0
CHILLS: 0
SORE THROAT: 0
TREMORS: 0
ABDOMINAL PAIN: 0

## 2025-01-06 NOTE — PROGRESS NOTES
Subjective   Patient ID: Ely Melgar is a 49 y.o. female with a history of HTN and Graves disease who presents for No chief complaint on file..    Virtual or Telephone Consent    An interactive audio and video telecommunication system which permits real time communications between the patient (at the originating site) and provider (at the distant site) was utilized to provide this telehealth service.   Verbal consent was requested and obtained from Ely Melgar on this date, 01/06/25 for a telehealth visit.     HPI the patient is complaining of cough, nasal congestion with yellow nasal discharge which she has been having for 2 weeks. She takes DayQuil with not much improvement of her symptoms.  She denies fever, chills, earaches, sore throat, shortness of breath or wheezing.    Review of Systems   Constitutional:  Negative for appetite change, chills, fatigue and fever.   HENT:  Positive for congestion. Negative for ear pain, facial swelling, hearing loss, nosebleeds and sore throat.    Eyes:  Negative for pain, discharge and visual disturbance.   Respiratory:  Positive for cough. Negative for choking, chest tightness, shortness of breath and wheezing.    Cardiovascular:  Negative for chest pain, palpitations and leg swelling.   Gastrointestinal:  Negative for abdominal distention, abdominal pain, anal bleeding, constipation, diarrhea, nausea and vomiting.   Endocrine: Negative for cold intolerance, heat intolerance, polydipsia, polyphagia and polyuria.   Genitourinary:  Negative for difficulty urinating, frequency, hematuria and urgency.   Musculoskeletal:  Negative for arthralgias, gait problem, joint swelling and myalgias.   Skin:  Negative for color change and rash.   Neurological:  Negative for dizziness, tremors, syncope, weakness, numbness and headaches.   Psychiatric/Behavioral:  Negative for behavioral problems, confusion, sleep disturbance and suicidal ideas. The patient is not nervous/anxious.         Objective   There were no vitals taken for this visit.    Physical Exam  Constitutional:       General: She is not in acute distress.     Appearance: Normal appearance.   HENT:      Head: Normocephalic and atraumatic.      Nose: Nose normal.   Eyes:      Extraocular Movements: Extraocular movements intact.      Conjunctiva/sclera: Conjunctivae normal.      Pupils: Pupils are equal, round, and reactive to light.   Cardiovascular:      Rate and Rhythm: Normal rate and regular rhythm.      Pulses: Normal pulses.      Heart sounds: Normal heart sounds.   Pulmonary:      Effort: Pulmonary effort is normal.      Breath sounds: Normal breath sounds.   Abdominal:      General: Bowel sounds are normal.      Palpations: Abdomen is soft.   Musculoskeletal:         General: Normal range of motion.      Cervical back: Normal range of motion and neck supple.   Neurological:      General: No focal deficit present.      Mental Status: She is alert and oriented to person, place, and time.   Psychiatric:         Mood and Affect: Mood normal.         Behavior: Behavior normal.         Thought Content: Thought content normal.         Judgment: Judgment normal.         Assessment/Plan   Acute bronchitis  Start Z-Shane as directed  Take OTC decongestant  Use fluticasone nasal spray 2 sprays in each nostril daily    Hypertension  Blood pressure well controlled  low sodium diet, DASH or Mediterranean diet reccommended   Try to exercise 30 minutes daily  Continue valsartan-HCTZ 160-12.5 mg daily    Graves disease  Stable  Follow-up with endocrinology    There is no height or weight on file to calculate BMI.  Morbid obesity  Elevated BMI, ideal BMI is between 18.5-24.9 kg/m2  Weight loss recommended  Low fat, low cholesterol diet, low carbohydrate diet  Exercise at least 30 minutes daily    Health maintenance  Mammogram and Cologuard orders are placed  Vaccinations declined    Follow-up in 3 months or sooner if needed        Pt 29.2 Weeks gestation

## 2025-01-16 ENCOUNTER — APPOINTMENT (OUTPATIENT)
Dept: OPHTHALMOLOGY | Facility: CLINIC | Age: 50
End: 2025-01-16
Payer: COMMERCIAL

## 2025-01-22 ENCOUNTER — APPOINTMENT (OUTPATIENT)
Dept: OPHTHALMOLOGY | Facility: CLINIC | Age: 50
End: 2025-01-22
Payer: COMMERCIAL

## 2025-01-22 DIAGNOSIS — E07.9 THYROID EYE DISEASE: Primary | ICD-10-CM

## 2025-01-22 DIAGNOSIS — H57.89 THYROID EYE DISEASE: Primary | ICD-10-CM

## 2025-01-22 PROCEDURE — 99213 OFFICE O/P EST LOW 20 MIN: CPT | Performed by: PSYCHIATRY & NEUROLOGY

## 2025-01-22 ASSESSMENT — ENCOUNTER SYMPTOMS
GASTROINTESTINAL NEGATIVE: 0
NEUROLOGICAL NEGATIVE: 0
MUSCULOSKELETAL NEGATIVE: 0
CONSTITUTIONAL NEGATIVE: 0
HEMATOLOGIC/LYMPHATIC NEGATIVE: 0
PSYCHIATRIC NEGATIVE: 0
CARDIOVASCULAR NEGATIVE: 0
EYES NEGATIVE: 1
RESPIRATORY NEGATIVE: 0
ALLERGIC/IMMUNOLOGIC NEGATIVE: 0
ENDOCRINE NEGATIVE: 0

## 2025-01-22 ASSESSMENT — VISUAL ACUITY
METHOD: SNELLEN - LINEAR
OD_SC: 20/25
OS_PH_SC: 20/30
OS_SC: 20/40

## 2025-01-22 ASSESSMENT — TONOMETRY
IOP_METHOD: TONOPEN
OS_IOP_MMHG: 17
OD_IOP_MMHG: 20

## 2025-01-22 ASSESSMENT — SLIT LAMP EXAM - LIDS: COMMENTS: TRACE INFERIOR SCLERAL SHOW

## 2025-01-22 ASSESSMENT — PACHYMETRY
OD_CT(UM): 13
OS_CT(UM): 11

## 2025-01-22 NOTE — PROGRESS NOTES
Assessment and Plan    01/22/2025 Andrzej OD 27 & OS 25 at base 94.  09/30/2024 Andrzej OD 27 & OS 24 at base 94.    11/22/2024 CT orbits without contrast, which I personally reviewed, shows no lesion.    Lab Results   Component Value Date/Time    [id number] 636 11/22/2024 1021    [id number] 639 01/06/2023 1044    [id number] 390 09/15/2021 0000     Thyroid eye disease studies  Lab Results   Component Value Date/Time    RECE 11.90 (H) 09/27/2023 0833    TSI 2.7 (H) 10/22/2024 1021    TSI 2.9 (H) 07/18/2024 1005    TSI 2.4 (H) 02/19/2024 1114    TSI 3.9 (H) 09/27/2023 0833      09/30/2024 OCT RNFL OD 99 & OS 99.    01/22/2025 HVF 24-2 OD fovea 39, wnl MD -0.01 & OS fovea 35, wnl MD +0.96.  09/30/2024 HVF 24-2 OD fovea 40, FL 15/17, FP 10%, FN 13%, low reliability, possible superior arcuate MD -4.91 & OS fovea 35, wnl MD -0.95.    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     Lid findings and proptosis consistent with thyroid eye disease remain stable. Fontenot visual field (HVF) repeat today is normal, and I see no evidence of optic neuropathy. I do not recommend teprotumumab or intravenous methylprednisolone at this point given relatively mild thyroid eye disease. Surgical correction will be considered in the future unless worsening quickly.    Plan    Thyroid function control.  Use artificial tears as needed up to 3-4 times per day.  No smoking although role of cannabis is not as clear as tobacco.    Follow up in 6 months with HVF & OCT. (Dilated 9/30/2024).

## 2025-02-07 ENCOUNTER — APPOINTMENT (OUTPATIENT)
Dept: PRIMARY CARE | Facility: CLINIC | Age: 50
End: 2025-02-07
Payer: COMMERCIAL

## 2025-04-02 ENCOUNTER — APPOINTMENT (OUTPATIENT)
Dept: ENDOCRINOLOGY | Facility: CLINIC | Age: 50
End: 2025-04-02
Payer: COMMERCIAL

## 2025-04-17 ENCOUNTER — APPOINTMENT (OUTPATIENT)
Dept: PRIMARY CARE | Facility: CLINIC | Age: 50
End: 2025-04-17
Payer: COMMERCIAL

## 2025-04-29 ENCOUNTER — TELEPHONE (OUTPATIENT)
Dept: PRIMARY CARE | Facility: CLINIC | Age: 50
End: 2025-04-29

## 2025-04-29 ENCOUNTER — APPOINTMENT (OUTPATIENT)
Dept: PRIMARY CARE | Facility: CLINIC | Age: 50
End: 2025-04-29
Payer: COMMERCIAL

## 2025-04-29 VITALS
DIASTOLIC BLOOD PRESSURE: 80 MMHG | BODY MASS INDEX: 31.89 KG/M2 | HEIGHT: 63 IN | WEIGHT: 180 LBS | TEMPERATURE: 97.1 F | SYSTOLIC BLOOD PRESSURE: 126 MMHG

## 2025-04-29 DIAGNOSIS — Z00.00 ROUTINE ADULT HEALTH MAINTENANCE: Primary | ICD-10-CM

## 2025-04-29 LAB
ALBUMIN SERPL BCP-MCNC: 4.1 G/DL (ref 3.4–5)
ALP SERPL-CCNC: 92 U/L (ref 45–117)
ALT SERPL W P-5'-P-CCNC: 24 U/L (ref 16–63)
ANION GAP SERPL CALC-SCNC: 14 MMOL/L (ref 10–20)
AST SERPL W P-5'-P-CCNC: 21 U/L (ref 15–37)
BASOPHILS # BLD AUTO: 0.02 X10*3/UL (ref 0.1–1.6)
BASOPHILS NFR BLD AUTO: 0.59 % (ref 0–0.3)
BILIRUB SERPL-MCNC: 0.5 MG/DL (ref 0.2–1)
BUN SERPL-MCNC: 14 MG/DL (ref 7–18)
CALCIUM SERPL-MCNC: 9.2 MG/DL (ref 8.5–10.1)
CHLORIDE SERPL-SCNC: 106 MMOL/L (ref 98–107)
CHOLEST SERPL-MCNC: 215 MG/DL (ref 0–199)
CHOLESTEROL/HDL RATIO: 3.5 (ref 4.2–7)
CO2 SERPL-SCNC: 28 MMOL/L (ref 21–32)
CREAT SERPL-MCNC: 0.67 MG/DL (ref 0.6–1.1)
EGFRCR SERPLBLD CKD-EPI 2021: >90 ML/MIN/1.73M*2
EOSINOPHIL # BLD AUTO: 0.04 X10*3/UL (ref 0.04–0.5)
EOSINOPHIL NFR BLD AUTO: 1.4 % (ref 0.7–7)
ERYTHROCYTE [DISTWIDTH] IN BLOOD BY AUTOMATED COUNT: 13.1 % (ref 11.5–14.5)
GLUCOSE SERPL-MCNC: 94 MG/DL (ref 74–100)
HCT VFR BLD AUTO: 38.9 % (ref 36.6–46.6)
HDLC SERPL-MCNC: 62 MG/DL (ref 40–59)
HGB BLD-MCNC: 13.91 G/DL (ref 12–15.4)
IS PATIENT FASTING: YES
LDLC SERPL DIRECT ASSAY-MCNC: 126 MG/DL (ref 0–100)
LYMPHOCYTES # BLD AUTO: 1.65 X10*3/UL (ref 0–6)
LYMPHOCYTES NFR BLD AUTO: 51.57 % (ref 20.5–51.1)
MCH RBC QN AUTO: 33 PG (ref 26–32)
MCHC RBC AUTO-ENTMCNC: 35.8 G/DL (ref 31–38)
MCV RBC AUTO: 92.3 FL (ref 80–96)
MONOCYTES # BLD AUTO: 0.24 X10*3/UL (ref 1.6–24.9)
MONOCYTES NFR BLD AUTO: 7.54 % (ref 1.7–9.3)
NEUTROPHILS # BLD AUTO: 1.24 X10*3/UL (ref 1.4–6.5)
NEUTROPHILS NFR BLD AUTO: 38.9 % (ref 42.2–75.2)
PLATELET # BLD AUTO: 297.9 X10*3/UL (ref 150–450)
PMV BLD AUTO: 7.51 FL (ref 7.8–11)
POTASSIUM SERPL-SCNC: 4 MMOL/L (ref 3.5–5.1)
PROT SERPL-MCNC: 7.4 G/DL (ref 6.4–8.2)
RBC # BLD AUTO: 4.21 X10*6/UL (ref 3.9–5.3)
SODIUM SERPL-SCNC: 144 MMOL/L (ref 136–145)
TRIGL SERPL-MCNC: 71 MG/DL
WBC # BLD AUTO: 3.19 X10*3/UL (ref 4.5–10.5)

## 2025-04-29 PROCEDURE — 90471 IMMUNIZATION ADMIN: CPT | Performed by: PHYSICIAN ASSISTANT

## 2025-04-29 PROCEDURE — 80053 COMPREHEN METABOLIC PANEL: CPT | Performed by: PHYSICIAN ASSISTANT

## 2025-04-29 PROCEDURE — 90715 TDAP VACCINE 7 YRS/> IM: CPT | Performed by: PHYSICIAN ASSISTANT

## 2025-04-29 PROCEDURE — 80061 LIPID PANEL: CPT | Performed by: PHYSICIAN ASSISTANT

## 2025-04-29 PROCEDURE — 1036F TOBACCO NON-USER: CPT | Performed by: PHYSICIAN ASSISTANT

## 2025-04-29 PROCEDURE — 85025 COMPLETE CBC W/AUTO DIFF WBC: CPT | Performed by: PHYSICIAN ASSISTANT

## 2025-04-29 PROCEDURE — 99396 PREV VISIT EST AGE 40-64: CPT | Performed by: PHYSICIAN ASSISTANT

## 2025-04-29 PROCEDURE — 3074F SYST BP LT 130 MM HG: CPT | Performed by: PHYSICIAN ASSISTANT

## 2025-04-29 PROCEDURE — 3079F DIAST BP 80-89 MM HG: CPT | Performed by: PHYSICIAN ASSISTANT

## 2025-04-29 PROCEDURE — 3008F BODY MASS INDEX DOCD: CPT | Performed by: PHYSICIAN ASSISTANT

## 2025-04-29 ASSESSMENT — COLUMBIA-SUICIDE SEVERITY RATING SCALE - C-SSRS
2. HAVE YOU ACTUALLY HAD ANY THOUGHTS OF KILLING YOURSELF?: NO
6. HAVE YOU EVER DONE ANYTHING, STARTED TO DO ANYTHING, OR PREPARED TO DO ANYTHING TO END YOUR LIFE?: NO
1. IN THE PAST MONTH, HAVE YOU WISHED YOU WERE DEAD OR WISHED YOU COULD GO TO SLEEP AND NOT WAKE UP?: NO

## 2025-04-29 ASSESSMENT — ENCOUNTER SYMPTOMS
ARTHRALGIAS: 0
FREQUENCY: 0
WHEEZING: 0
SHORTNESS OF BREATH: 0
HEADACHES: 0
EYE PAIN: 0
SORE THROAT: 0
COLOR CHANGE: 0
PALPITATIONS: 0
COUGH: 0
DIARRHEA: 0
NERVOUS/ANXIOUS: 0
EYE DISCHARGE: 0
DIZZINESS: 0
NAUSEA: 0
LOSS OF SENSATION IN FEET: 0
WEAKNESS: 0
DIFFICULTY URINATING: 0
POLYDIPSIA: 0
TREMORS: 0
POLYPHAGIA: 0
MYALGIAS: 0
ABDOMINAL PAIN: 0
OCCASIONAL FEELINGS OF UNSTEADINESS: 0
CONFUSION: 0
VOMITING: 0
FEVER: 0
FACIAL SWELLING: 0
NUMBNESS: 0
APPETITE CHANGE: 0
SLEEP DISTURBANCE: 0
HEMATURIA: 0
JOINT SWELLING: 0
DEPRESSION: 0
CHEST TIGHTNESS: 0
CHOKING: 0
CONSTIPATION: 0
ANAL BLEEDING: 0
FATIGUE: 0
CHILLS: 0
ABDOMINAL DISTENTION: 0

## 2025-04-29 ASSESSMENT — PAIN SCALES - GENERAL: PAINLEVEL_OUTOF10: 0-NO PAIN

## 2025-04-29 NOTE — PROGRESS NOTES
"Subjective   Patient ID: Ely Melgar is a 49 y.o. female with a history of HTN and Graves disease who presents for Annual Exam.    HPI the patient is presented for physical exam.  Her blood pressure is well-controlled.  She denies shortness of breath, chest pain or leg edema.  She does not exercise and she is not on any diet.  Her mood is stable.    Review of Systems   Constitutional:  Negative for appetite change, chills, fatigue and fever.   HENT:  Negative for congestion, ear pain, facial swelling, hearing loss, nosebleeds and sore throat.    Eyes:  Negative for pain, discharge and visual disturbance.   Respiratory:  Negative for cough, choking, chest tightness, shortness of breath and wheezing.    Cardiovascular:  Negative for chest pain, palpitations and leg swelling.   Gastrointestinal:  Negative for abdominal distention, abdominal pain, anal bleeding, constipation, diarrhea, nausea and vomiting.   Endocrine: Negative for cold intolerance, heat intolerance, polydipsia, polyphagia and polyuria.   Genitourinary:  Negative for difficulty urinating, frequency, hematuria and urgency.   Musculoskeletal:  Negative for arthralgias, gait problem, joint swelling and myalgias.   Skin:  Negative for color change and rash.   Neurological:  Negative for dizziness, tremors, syncope, weakness, numbness and headaches.   Psychiatric/Behavioral:  Negative for behavioral problems, confusion, sleep disturbance and suicidal ideas. The patient is not nervous/anxious.        Objective   /80 (Patient Position: Sitting)   Temp 36.2 °C (97.1 °F) (Temporal)   Ht 1.6 m (5' 3\")   Wt 81.6 kg (180 lb)   Breastfeeding No   BMI 31.89 kg/m²     Physical Exam  Constitutional:       General: She is not in acute distress.     Appearance: Normal appearance.   HENT:      Head: Normocephalic and atraumatic.      Nose: Nose normal.   Eyes:      Extraocular Movements: Extraocular movements intact.      Conjunctiva/sclera: Conjunctivae " normal.      Pupils: Pupils are equal, round, and reactive to light.   Cardiovascular:      Rate and Rhythm: Normal rate and regular rhythm.      Pulses: Normal pulses.      Heart sounds: Normal heart sounds.   Pulmonary:      Effort: Pulmonary effort is normal.      Breath sounds: Normal breath sounds.   Abdominal:      General: Bowel sounds are normal.      Palpations: Abdomen is soft.   Musculoskeletal:         General: Normal range of motion.      Cervical back: Normal range of motion and neck supple.   Neurological:      General: No focal deficit present.      Mental Status: She is alert and oriented to person, place, and time.   Psychiatric:         Mood and Affect: Mood normal.         Behavior: Behavior normal.         Thought Content: Thought content normal.         Judgment: Judgment normal.         Assessment/Plan   Health maintenance  Mammogram 12/2/2024, negative  Cologuard order is placed, not done  Shingrix at age 50  Tdap vaccination is given today  Follow-up with GYN    Hypertension  Blood pressure well controlled  Continue valsartan-HCTZ 160-12.5 mg daily  low sodium diet, DASH or Mediterranean diet reccommended   Try to exercise 30 minutes daily    Graves disease  Stable  on Methimazole 5 mg once a day   Follow-up with endocrinology    Body mass index is 31.89 kg/m².  Elevated BMI, ideal BMI is between 18.5-24.9 kg/m2  Weight loss recommended  Low fat, low cholesterol diet, low carbohydrate diet  Exercise at least 30 minutes daily    Follow-up in 4 months or sooner if needed

## 2025-04-29 NOTE — TELEPHONE ENCOUNTER
----- Message from Rut Broderick sent at 4/29/2025  2:06 PM EDT -----  Please call her with the results.  Her white blood count slightly went down.  I would like to repeat her CBC in 1 or 2 months to see the stability.  Her cholesterol is the same.  Her kidney and liver   functions are good.  ----- Message -----  From: Lab, Background User  Sent: 4/29/2025   1:40 PM EDT  To: Rut Broderick PA-C     There is no infection of the ear but there is fluid behind the ear.  Continue Claritin.  He cannot Mucinex or Flonase nasal spray as needed.  Close follow up with primary care provider, call to make an appointment.  Return to the emergency department any new or worsening symptoms.  Increase fluids and rest.

## 2025-05-09 ENCOUNTER — APPOINTMENT (OUTPATIENT)
Dept: PRIMARY CARE | Facility: CLINIC | Age: 50
End: 2025-05-09
Payer: COMMERCIAL

## 2025-05-20 ENCOUNTER — OFFICE VISIT (OUTPATIENT)
Dept: PRIMARY CARE | Facility: CLINIC | Age: 50
End: 2025-05-20
Payer: COMMERCIAL

## 2025-05-20 VITALS
HEIGHT: 63 IN | TEMPERATURE: 97.5 F | WEIGHT: 180 LBS | BODY MASS INDEX: 31.89 KG/M2 | SYSTOLIC BLOOD PRESSURE: 122 MMHG | DIASTOLIC BLOOD PRESSURE: 80 MMHG

## 2025-05-20 DIAGNOSIS — E55.9 VITAMIN D DEFICIENCY: ICD-10-CM

## 2025-05-20 DIAGNOSIS — H61.23 BILATERAL IMPACTED CERUMEN: Primary | ICD-10-CM

## 2025-05-20 DIAGNOSIS — I10 PRIMARY HYPERTENSION: ICD-10-CM

## 2025-05-20 DIAGNOSIS — E05.90 HYPERTHYROIDISM: ICD-10-CM

## 2025-05-20 DIAGNOSIS — R73.03 PREDIABETES: ICD-10-CM

## 2025-05-20 DIAGNOSIS — E78.00 PURE HYPERCHOLESTEROLEMIA: ICD-10-CM

## 2025-05-20 LAB
25(OH)D3 SERPL-MCNC: 23 NG/ML (ref 30–100)
ALBUMIN SERPL BCP-MCNC: 4 G/DL (ref 3.4–5)
ALP SERPL-CCNC: 98 U/L (ref 45–117)
ALT SERPL W P-5'-P-CCNC: 23 U/L (ref 16–63)
ANION GAP SERPL CALC-SCNC: 13 MMOL/L (ref 10–20)
AST SERPL W P-5'-P-CCNC: 19 U/L (ref 15–37)
BILIRUB SERPL-MCNC: 0.4 MG/DL (ref 0.2–1)
BUN SERPL-MCNC: 16 MG/DL (ref 7–18)
CALCIUM SERPL-MCNC: 9.6 MG/DL (ref 8.5–10.1)
CHLORIDE SERPL-SCNC: 100 MMOL/L (ref 98–107)
CO2 SERPL-SCNC: 28 MMOL/L (ref 21–32)
CREAT SERPL-MCNC: 0.79 MG/DL (ref 0.6–1.1)
EGFRCR SERPLBLD CKD-EPI 2021: >90 ML/MIN/1.73M*2
GLUCOSE SERPL-MCNC: 99 MG/DL (ref 74–100)
HBA1C MFR BLD: 5.8 %
POTASSIUM SERPL-SCNC: 4 MMOL/L (ref 3.5–5.1)
PROT SERPL-MCNC: 7.7 G/DL (ref 6.4–8.2)
SODIUM SERPL-SCNC: 137 MMOL/L (ref 136–145)
TSH SERPL-ACNC: 8.71 MIU/L (ref 0.44–3.98)

## 2025-05-20 PROCEDURE — 1036F TOBACCO NON-USER: CPT | Performed by: PHYSICIAN ASSISTANT

## 2025-05-20 PROCEDURE — 3074F SYST BP LT 130 MM HG: CPT | Performed by: PHYSICIAN ASSISTANT

## 2025-05-20 PROCEDURE — 69209 REMOVE IMPACTED EAR WAX UNI: CPT | Performed by: PHYSICIAN ASSISTANT

## 2025-05-20 PROCEDURE — 3008F BODY MASS INDEX DOCD: CPT | Performed by: PHYSICIAN ASSISTANT

## 2025-05-20 PROCEDURE — 3079F DIAST BP 80-89 MM HG: CPT | Performed by: PHYSICIAN ASSISTANT

## 2025-05-20 PROCEDURE — 82306 VITAMIN D 25 HYDROXY: CPT | Performed by: PHYSICIAN ASSISTANT

## 2025-05-20 PROCEDURE — 84443 ASSAY THYROID STIM HORMONE: CPT | Performed by: PHYSICIAN ASSISTANT

## 2025-05-20 PROCEDURE — 83036 HEMOGLOBIN GLYCOSYLATED A1C: CPT | Performed by: PHYSICIAN ASSISTANT

## 2025-05-20 PROCEDURE — 99214 OFFICE O/P EST MOD 30 MIN: CPT | Performed by: PHYSICIAN ASSISTANT

## 2025-05-20 PROCEDURE — 80053 COMPREHEN METABOLIC PANEL: CPT | Performed by: PHYSICIAN ASSISTANT

## 2025-05-20 ASSESSMENT — ENCOUNTER SYMPTOMS
HEMATURIA: 0
SLEEP DISTURBANCE: 0
EYE DISCHARGE: 0
NERVOUS/ANXIOUS: 0
TREMORS: 0
NUMBNESS: 0
CHOKING: 0
SHORTNESS OF BREATH: 0
APPETITE CHANGE: 0
COLOR CHANGE: 0
WEAKNESS: 0
POLYPHAGIA: 0
FEVER: 0
ANAL BLEEDING: 0
EYE PAIN: 0
MYALGIAS: 0
ABDOMINAL PAIN: 0
FATIGUE: 0
SORE THROAT: 0
CONSTIPATION: 0
DIFFICULTY URINATING: 0
VOMITING: 0
DIARRHEA: 0
FACIAL SWELLING: 0
WHEEZING: 0
PALPITATIONS: 0
ABDOMINAL DISTENTION: 0
DIZZINESS: 0
CONFUSION: 0
NAUSEA: 0
POLYDIPSIA: 0
ARTHRALGIAS: 0
FREQUENCY: 0
JOINT SWELLING: 0
CHEST TIGHTNESS: 0
HEADACHES: 0
COUGH: 0
CHILLS: 0

## 2025-05-20 ASSESSMENT — PAIN SCALES - GENERAL: PAINLEVEL_OUTOF10: 4

## 2025-05-20 ASSESSMENT — PATIENT HEALTH QUESTIONNAIRE - PHQ9
SUM OF ALL RESPONSES TO PHQ9 QUESTIONS 1 AND 2: 0
1. LITTLE INTEREST OR PLEASURE IN DOING THINGS: NOT AT ALL
2. FEELING DOWN, DEPRESSED OR HOPELESS: NOT AT ALL

## 2025-05-20 NOTE — PROGRESS NOTES
Subjective   Patient ID: Ely Melgar is a 49 y.o. female with a history of HTN and Graves disease who presents for Earache (Left and right:  right being the worse).    Earache   Pertinent negatives include no abdominal pain, coughing, diarrhea, headaches, hearing loss, rash, sore throat or vomiting.   The patient is complaining of fullness, itchiness and discomfort of ears bilaterally right more than left.  It feels clogged and has worsening of hearing.  She denies fever or chills.    Patient ID: Ely Melgar is a 49 y.o. female.    Ear Cerumen Removal    Date/Time: 5/20/2025 11:22 AM    Performed by: Rut Broderick PA-C  Authorized by: Rut Broderick PA-C    Consent:     Consent obtained:  Verbal    Risks, benefits, and alternatives were discussed: yes      Risks discussed:  TM perforation, pain, infection, dizziness and bleeding    Alternatives discussed:  Alternative treatment  Universal protocol:     Procedure explained and questions answered to patient or proxy's satisfaction: yes      Patient identity confirmed:  Verbally with patient  Procedure details:     Location:  L ear and R ear    Procedure type: irrigation      Procedure outcomes: unable to remove cerumen    Post-procedure details:     Inspection:  Some cerumen remaining    Procedure completion:  Procedure terminated at patient's request      Review of Systems   Constitutional:  Negative for appetite change, chills, fatigue and fever.   HENT:  Positive for ear pain. Negative for congestion, facial swelling, hearing loss, nosebleeds and sore throat.    Eyes:  Negative for pain, discharge and visual disturbance.   Respiratory:  Negative for cough, choking, chest tightness, shortness of breath and wheezing.    Cardiovascular:  Negative for chest pain, palpitations and leg swelling.   Gastrointestinal:  Negative for abdominal distention, abdominal pain, anal bleeding, constipation, diarrhea, nausea and vomiting.   Endocrine: Negative for  "cold intolerance, heat intolerance, polydipsia, polyphagia and polyuria.   Genitourinary:  Negative for difficulty urinating, frequency, hematuria and urgency.   Musculoskeletal:  Negative for arthralgias, gait problem, joint swelling and myalgias.   Skin:  Negative for color change and rash.   Neurological:  Negative for dizziness, tremors, syncope, weakness, numbness and headaches.   Psychiatric/Behavioral:  Negative for behavioral problems, confusion, sleep disturbance and suicidal ideas. The patient is not nervous/anxious.        Objective   /80 (Patient Position: Sitting)   Temp 36.4 °C (97.5 °F) (Temporal)   Ht 1.6 m (5' 3\")   Wt 81.6 kg (180 lb)   Breastfeeding No   BMI 31.89 kg/m²     Physical Exam  Constitutional:       General: She is not in acute distress.     Appearance: Normal appearance.   HENT:      Head: Normocephalic and atraumatic.      Nose: Nose normal.   Eyes:      Extraocular Movements: Extraocular movements intact.      Conjunctiva/sclera: Conjunctivae normal.      Pupils: Pupils are equal, round, and reactive to light.   Cardiovascular:      Rate and Rhythm: Normal rate and regular rhythm.      Pulses: Normal pulses.      Heart sounds: Normal heart sounds.   Pulmonary:      Effort: Pulmonary effort is normal.      Breath sounds: Normal breath sounds.   Abdominal:      General: Bowel sounds are normal.      Palpations: Abdomen is soft.   Musculoskeletal:         General: Normal range of motion.      Cervical back: Normal range of motion and neck supple.   Neurological:      General: No focal deficit present.      Mental Status: She is alert and oriented to person, place, and time.   Psychiatric:         Mood and Affect: Mood normal.         Behavior: Behavior normal.         Thought Content: Thought content normal.         Judgment: Judgment normal.         Assessment/Plan   Cerumen impaction bilaterally  Procedure was not tolerated  It was stopped due to patient's request  Will " refer the patient to ENT, referral is given    Dyslipidemia  The patient started low-fat diet  I recommend Mediterranean diet, which include fish, chicken, vegetables and olive oil  Exercise daily for 30 minutes at least 3 times a week    Vitamin D deficiency  Continue vitamin D 50,000 units once a week    Hypertension  Blood pressure well controlled  Continue valsartan-HCTZ 160-12.5 mg daily  low sodium diet, DASH or Mediterranean diet reccommended   Try to exercise 30 minutes daily  Prediabetes  Continue low-carb diet  Try to exercise 30 minutes every other day    Graves disease  Stable  on Methimazole 5 mg once a day   Follow-up with endocrinology    Body mass index is 31.89 kg/m².  Elevated BMI, ideal BMI is between 18.5-24.9 kg/m2  Weight loss recommended  Low fat, low cholesterol diet, low carbohydrate diet  Exercise at least 30 minutes daily    Health maintenance  Mammogram 12/2/2024, negative  Cologuard order is placed, not done  Shingrix at age 50  Tdap vaccination is given today  Follow-up with GYN    Follow-up in 4 months or sooner if needed

## 2025-05-23 ENCOUNTER — TELEPHONE (OUTPATIENT)
Dept: PRIMARY CARE | Facility: CLINIC | Age: 50
End: 2025-05-23
Payer: COMMERCIAL

## 2025-05-23 DIAGNOSIS — E55.9 VITAMIN D DEFICIENCY: ICD-10-CM

## 2025-05-23 RX ORDER — ERGOCALCIFEROL 1.25 MG/1
CAPSULE ORAL
Qty: 13 CAPSULE | Refills: 1 | Status: SHIPPED | OUTPATIENT
Start: 2025-05-23

## 2025-05-23 NOTE — TELEPHONE ENCOUNTER
----- Message from Rut Broderick sent at 5/23/2025 12:47 PM EDT -----  Please call the patient with blood test results.  Her TSH is elevated.  She needs to contact her endocrinology to adjust her methimazole.  Her vitamin D is lower than it was 6 months ago.  Ask her if   she stopped taking vitamin D once a week.  I did send the prescription to the pharmacy.  She needs to continue taking it.  Her prediabetes are stable.  Kidney and liver function are within normal   limits.  ----- Message -----  From: Lab, Background User  Sent: 5/20/2025  12:40 PM EDT  To: Rut Broderick PA-C

## 2025-06-06 DIAGNOSIS — I10 HYPERTENSION, UNSPECIFIED TYPE: ICD-10-CM

## 2025-06-09 RX ORDER — VALSARTAN AND HYDROCHLOROTHIAZIDE 160; 12.5 MG/1; MG/1
1 TABLET, FILM COATED ORAL DAILY
Qty: 90 TABLET | Refills: 0 | Status: SHIPPED | OUTPATIENT
Start: 2025-06-09

## 2025-06-11 ENCOUNTER — APPOINTMENT (OUTPATIENT)
Dept: OTOLARYNGOLOGY | Facility: CLINIC | Age: 50
End: 2025-06-11
Payer: COMMERCIAL

## 2025-06-11 VITALS — WEIGHT: 178 LBS | BODY MASS INDEX: 31.53 KG/M2

## 2025-06-11 DIAGNOSIS — H60.63 CHRONIC OTITIS EXTERNA OF BOTH EARS, UNSPECIFIED TYPE: ICD-10-CM

## 2025-06-11 DIAGNOSIS — H61.23 BILATERAL IMPACTED CERUMEN: ICD-10-CM

## 2025-06-11 PROCEDURE — 69209 REMOVE IMPACTED EAR WAX UNI: CPT | Performed by: GENERAL PRACTICE

## 2025-06-11 PROCEDURE — 1036F TOBACCO NON-USER: CPT | Performed by: GENERAL PRACTICE

## 2025-06-11 PROCEDURE — G8433 SCR FOR DEP NOT CPT DOC RSN: HCPCS | Performed by: GENERAL PRACTICE

## 2025-06-11 PROCEDURE — 99204 OFFICE O/P NEW MOD 45 MIN: CPT | Performed by: GENERAL PRACTICE

## 2025-06-11 RX ORDER — CIPROFLOXACIN AND DEXAMETHASONE 3; 1 MG/ML; MG/ML
3 SUSPENSION/ DROPS AURICULAR (OTIC) 2 TIMES DAILY
Qty: 7.5 ML | Refills: 1 | Status: SHIPPED | OUTPATIENT
Start: 2025-06-11 | End: 2025-06-18

## 2025-06-11 NOTE — PROGRESS NOTES
Otolaryngology - Head and Neck Surgery Outpatient New Patient Visit Note        Assessment/Plan:   Problem List Items Addressed This Visit           ICD-10-CM       ENT    Bilateral impacted cerumen H61.23     Other Visit Diagnoses         Codes      Chronic otitis externa of both ears, unspecified type     H60.63    Relevant Medications    ciprofloxacin-dexamethasone (CiproDEX) otic suspension            Severe b/l cerumen impaction with underlying infectious/inflammatory debris.   Right ear essentially cleared.  No apparent AOM.   Left ear unable to tolerate full debridement.   Will treat with ciprodex b/l and RTC to complete left debridement.           Follow up:  -plan for follow up in clinic as needed and in approximately 1 week    All of the above findings, impressions, treatment planning and follow up plans were discussed with the patient who indicated understanding.  the patient was instructed to contact or return to clinic sooner if symptoms/signs persist or worsen despite the above management.      Edenilson Collado MD  Otolaryngology - Head and Neck Surgery            History Of Present Illness  Ely Melgar is a 49 y.o. female presenting for cerumen.     Noted to have impaction at .   Attempted debridement with flushing without relief.   Notes worsned ear fullness and some otalgia.   Notes itching/irritation in ears.     Infrequent prior otitis history   B/l hearing loss.   No otorrhea.                 Past Medical History  She has a past medical history of HTN (hypertension), Thyroid nodule, and Unspecified abdominal hernia without obstruction or gangrene.    Surgical History  She has no past surgical history on file.     Social History  She reports that she has never smoked. She has never used smokeless tobacco. She reports current alcohol use. She reports current drug use. Drug: Marijuana.    Family History  Family History[1]     Allergies  Patient has no known allergies.    Review of Systems  ROS:  Pertinent positives as noted in HPI.    - CONSTITUTIONAL: Does not report weight loss, fever or chills.    - HEENT:   Ear: Does not report tinnitus, vertigo,       , otorrhea  Nose: Does not report congestion, rhinorrhea, epistaxis, decreased smell  Throat: Does not report pain, dysphagia, odynophagia  Larynx: Does not report hoarseness,  difficulty breathing, pain with speaking (odynophonia)  Neck: Does not report new masses, pain, swelling  Face: Does not report sinus pain, pressure, swelling, numbness, weakness     - RESPIRATORY: Does not report SOB or cough.    - CV: Does not report palpitations or chest pain.     - GI: Does not report abdominal pain, nausea, vomiting or diarrhea.    - : Does not report dysuria or urinary frequency.    - MSK: Does not report myalgia or joint pain.    - SKIN: Does not report rash or pruritus.    - NEUROLOGICAL: Does not report headache or syncope.    - PSYCHIATRIC: Does not report recent changes in mood. Does not report anxiety or depression.         Physical Exam:     GENERAL:   Alert & Oriented to person, place and time; Normal affect and appearance. Well developed and well nourished. Conversant & cooperative with examination.     HEAD:   Normocephalic, atraumatic. No sinus tenderness to palpation. Normal parotid bilaterally. Normal facial strength.     NEUROLOGIC:   Cranial nerves II-XII grossly intact, gait WNL. Normal mood and affect.    EYES:   Extraocular movements intact. Pupils equal, round, reactive to light and accommodation. No nystagmus, no ptosis. no scleral injection.    EAR:   Normal auricle. No discomfort or TTP with manipulation.   Handheld otoscopic exam showed narrow  external auditory canals bilaterally.  B/l cerumen impaction with underlying erythema and wet debris.  Right EAC cleared with underlying squamous debris and inflammation.   Left side unable to clear due to tolerance.  Extra time required.    Right tympanic membrane thickened/erythematous but  mobile without evidence of perforation, retraction or middle ear effusion.   Left tympanic membrane not visualized.      NOSE:   No external deformity. No external nasal lesions, lacerations, or scars. Nasal tip symmetrical with normal nasal valves.   Nasal cavity with essentially midline septum, normal mucosa and turbinates. No lesions, masses, purulence or polyps.     OC/OP:   Mucous membranes moist, no masses, lesions or exudates.   Normal tongue, floor of mouth, teeth, gums, lips. Normal posterior pharyngeal wall.    Normal tonsils without erythema, exudate or obvious calculi     NECK:   No neck masses or thyroid enlargement. Trachea midline. No tenderness to palpation    LYMPHATIC:   No cervical lymphadenopathy.     RESPIRATORY:   Symmetric chest elevation & no retractions. No significant hoarseness. No increased work of breathing.    CV:   No clubbing or cyanosis. No obvious edema    Skin:   No facial rashes, vesicles or lesions.     Extremities:   No gross abnormalities      Clinic Procedure    Binocular microscopy exam  Indication: tympanic membrane(s) could not be visualized adequately with handheld otoscopy.   Location:  bilateral ears  Visualization Instrument: A microscope was used to visualize through a speculum placed in the ear canal(s) to visualize the ear canal, tympanic membranes and to assist in assessment and removal of debris.  Findings:  see physical exam documentation  Patient Status: The patient tolerated the procedure well.   Complications: There were no complications.     Information review:  External sources (notes, imaging, lab results) listed below personally reviewed to aid in medical decision making process.  -  -  -         [1]   Family History  Problem Relation Name Age of Onset    Hypertension Father      Diabetes Father      Hyperlipidemia Father      Hypertension Sister

## 2025-06-16 DIAGNOSIS — H60.63 CHRONIC OTITIS EXTERNA OF BOTH EARS, UNSPECIFIED TYPE: ICD-10-CM

## 2025-06-16 RX ORDER — OFLOXACIN 3 MG/ML
2-3 SOLUTION AURICULAR (OTIC) 2 TIMES DAILY
Qty: 5 ML | Refills: 0 | Status: SHIPPED | OUTPATIENT
Start: 2025-06-16 | End: 2025-06-18 | Stop reason: SDUPTHER

## 2025-06-16 NOTE — TELEPHONE ENCOUNTER
Insurance will not cover ciprofloxacin-dexamethasone drops. Patient states they are to expensive. Insurance will cover ofloxacin otic. Per our conversation medication pended. Patient notified and follow up appointment rescheduled per your request.

## 2025-06-18 DIAGNOSIS — H60.63 CHRONIC OTITIS EXTERNA OF BOTH EARS, UNSPECIFIED TYPE: ICD-10-CM

## 2025-06-18 RX ORDER — OFLOXACIN 3 MG/ML
2-3 SOLUTION AURICULAR (OTIC) 2 TIMES DAILY
Qty: 5 ML | Refills: 0 | Status: SHIPPED | OUTPATIENT
Start: 2025-06-18 | End: 2025-06-25

## 2025-06-18 NOTE — TELEPHONE ENCOUNTER
Patient states pharmacy did not get new Rx that was sent in for her. Can you please resend it looks like the transmission failed.

## 2025-06-19 ENCOUNTER — APPOINTMENT (OUTPATIENT)
Dept: OTOLARYNGOLOGY | Facility: CLINIC | Age: 50
End: 2025-06-19
Payer: COMMERCIAL

## 2025-06-26 ENCOUNTER — APPOINTMENT (OUTPATIENT)
Dept: OTOLARYNGOLOGY | Facility: CLINIC | Age: 50
End: 2025-06-26
Payer: COMMERCIAL

## 2025-06-26 VITALS — BODY MASS INDEX: 31.53 KG/M2 | WEIGHT: 178 LBS

## 2025-06-26 DIAGNOSIS — H69.91 DYSFUNCTION OF RIGHT EUSTACHIAN TUBE: ICD-10-CM

## 2025-06-26 DIAGNOSIS — H73.891 RETRACTION OF TYMPANIC MEMBRANE OF RIGHT EAR: ICD-10-CM

## 2025-06-26 DIAGNOSIS — H61.22 IMPACTED CERUMEN OF LEFT EAR: Primary | ICD-10-CM

## 2025-06-26 NOTE — PROGRESS NOTES
Otolaryngology - Head and Neck Surgery Outpatient Established Patient Visit Note        Assessment/Plan:   Problem List Items Addressed This Visit    None  Visit Diagnoses         Codes      Impacted cerumen of left ear    -  Primary H61.22      Retraction of tympanic membrane of right ear     H73.891    Relevant Orders    CT internal auditory canals posterior fossa wo IV contrast      Dysfunction of right eustachian tube     H69.91              49yoF with chronic otitis externa and severe L>R cerumen impaction.  Right cerumen debridement completed with posterior retraction pocket extending beyond visual range.  Incomplete eversion with valsalva, with incodupexy.  Possible ossicular erosion.  No effusion.  No squamous debris in pocket noted.     Left cerumen impaction largely debrided but with limited tolerance of cerumen against TM and filling anterior medial EAC.  Will continue drops and RTC for completion.   No obvious TM retraction or otitis.     CT IAC to help eval for right middle ear pathology.        Follow up:  -plan for follow up in clinic as needed and in approximately 2 weeks    All of the above findings, impressions, treatment planning and follow up plans were discussed with the patient who indicated understanding.  the patient was instructed to contact or return to clinic sooner if symptoms/signs persist or worsen despite the above management.      Edenilson Collado MD  Otolaryngology - Head and Neck Surgery            History Of Present Illness  Ely Melgar is a 49 y.o. female presenting for follow up of impacted cerumen and chronic otitis.   Improved right ear hearing, no ongoing otalgia, otorrhea.  Ongoing left ear fullness and muffled hearing.  Some L sided otalgia intermittently.        Recall  Ely Melgar is a 49 y.o. female presenting for cerumen.      Noted to have impaction at .   Attempted debridement with flushing without relief.   Notes worsned ear fullness and some otalgia.   Notes  itching/irritation in ears.      Infrequent prior otitis history   B/l hearing loss.   No otorrhea.         Past Medical History  She has a past medical history of HTN (hypertension), Thyroid nodule, and Unspecified abdominal hernia without obstruction or gangrene.    Surgical History  She has no past surgical history on file.     Social History  She reports that she has never smoked. She has never used smokeless tobacco. She reports current alcohol use. She reports current drug use. Drug: Marijuana.    Family History  Family History[1]     Allergies  Patient has no known allergies.    Review of Systems  ROS: Pertinent positives as noted in HPI.    - CONSTITUTIONAL: Does not report weight loss, fever or chills.    - HEENT:   Ear: Does not report tinnitus, vertigo,    ,  , otorrhea  Nose: Does not report congestion, rhinorrhea, epistaxis, decreased smell  Throat: Does not report pain, dysphagia, odynophagia  Larynx: Does not report hoarseness,  difficulty breathing, pain with speaking (odynophonia)  Neck: Does not report new masses, pain, swelling  Face: Does not report sinus pain, pressure, swelling, numbness, weakness     - RESPIRATORY: Does not report SOB or cough.    - CV: Does not report palpitations or chest pain.     - GI: Does not report abdominal pain, nausea, vomiting or diarrhea.    - : Does not report dysuria or urinary frequency.    - MSK: Does not report myalgia or joint pain.    - SKIN: Does not report rash or pruritus.    - NEUROLOGICAL: Does not report headache or syncope.    - PSYCHIATRIC: Does not report recent changes in mood. Does not report anxiety or depression.         Physical Exam:     GENERAL:   Alert & Oriented to person, place and time; Normal affect and appearance. Well developed and well nourished. Conversant & cooperative with examination.     HEAD:   Normocephalic, atraumatic. No sinus tenderness to palpation. Normal parotid bilaterally. Normal facial strength.     NEUROLOGIC:    Cranial nerves II-XII grossly intact, gait WNL. Normal mood and affect.    EYES:   Extraocular movements intact. Pupils equal, round, reactive to light and accommodation. No nystagmus, no ptosis. no scleral injection.    EAR:   Normal auricle. No discomfort or TTP with manipulation.   Handheld otoscopic exam showed normal external auditory canals bilaterally.  Right EAC with mild cerumen debris, removed with suction.  Left EAC with severe impaction.  Largely cleared with suction, forceps.  H2O2 and extra time required.  Residual obstructive cerumen against TM, intolerant of debridement.    Right tympanic membrane clear and mobile with posterior retraction pocket and incudopexy without evidence of perforation,   or middle ear effusion.   Left tympanic membrane only visualized far posterior but clear  without obvious retraction or effusion.      NOSE:   No external deformity. No external nasal lesions, lacerations, or scars. Nasal tip symmetrical with normal nasal valves.   Nasal cavity with essentially midline septum, normal mucosa and turbinates. No lesions, masses, purulence or polyps.     OC/OP:   Mucous membranes moist, no masses, lesions or exudates.   Normal tongue, floor of mouth, teeth, gums, lips. Normal posterior pharyngeal wall.    Normal tonsils without erythema, exudate or obvious calculi     NECK:   No neck masses or thyroid enlargement. Trachea midline. No tenderness to palpation    LYMPHATIC:   No cervical lymphadenopathy.     RESPIRATORY:   Symmetric chest elevation & no retractions. No significant hoarseness. No increased work of breathing.    CV:   No clubbing or cyanosis. No obvious edema    Skin:   No facial rashes, vesicles or lesions.     Extremities:   No gross abnormalities      Clinic Procedure    Binocular microscopy exam  Indication: tympanic membrane(s) could not be visualized adequately with handheld otoscopy.   Location:  bilateral ears  Visualization Instrument: A microscope was used  to visualize through a speculum placed in the ear canal(s) to visualize the ear canal, tympanic membranes and to assist in assessment and removal of debris.  Findings:  see physical exam documentation  Patient Status: The patient tolerated the procedure well.   Complications: There were no complications.     Information review:  External sources (notes, imaging, lab results) listed below personally reviewed to aid in medical decision making process.  -  -  -         [1]   Family History  Problem Relation Name Age of Onset    Hypertension Father      Diabetes Father      Hyperlipidemia Father      Hypertension Sister

## 2025-07-09 ENCOUNTER — APPOINTMENT (OUTPATIENT)
Dept: OTOLARYNGOLOGY | Facility: CLINIC | Age: 50
End: 2025-07-09
Payer: COMMERCIAL

## 2025-07-11 ENCOUNTER — APPOINTMENT (OUTPATIENT)
Dept: RADIOLOGY | Facility: CLINIC | Age: 50
End: 2025-07-11
Payer: COMMERCIAL

## 2025-07-22 ENCOUNTER — APPOINTMENT (OUTPATIENT)
Dept: OPHTHALMOLOGY | Facility: CLINIC | Age: 50
End: 2025-07-22
Payer: COMMERCIAL

## 2025-07-25 ENCOUNTER — APPOINTMENT (OUTPATIENT)
Dept: RADIOLOGY | Facility: CLINIC | Age: 50
End: 2025-07-25
Payer: COMMERCIAL

## 2025-07-28 ENCOUNTER — APPOINTMENT (OUTPATIENT)
Dept: OTOLARYNGOLOGY | Facility: CLINIC | Age: 50
End: 2025-07-28
Payer: COMMERCIAL

## 2025-07-28 VITALS — BODY MASS INDEX: 31.53 KG/M2 | WEIGHT: 178 LBS

## 2025-07-28 DIAGNOSIS — H61.22 IMPACTED CERUMEN OF LEFT EAR: Primary | ICD-10-CM

## 2025-07-28 DIAGNOSIS — H73.893 RETRACTION OF TYMPANIC MEMBRANE OF BOTH EARS: ICD-10-CM

## 2025-07-28 DIAGNOSIS — H69.93 DYSFUNCTION OF BOTH EUSTACHIAN TUBES: ICD-10-CM

## 2025-07-28 PROCEDURE — 1036F TOBACCO NON-USER: CPT | Performed by: GENERAL PRACTICE

## 2025-07-28 PROCEDURE — 99214 OFFICE O/P EST MOD 30 MIN: CPT | Performed by: GENERAL PRACTICE

## 2025-07-28 PROCEDURE — 69210 REMOVE IMPACTED EAR WAX UNI: CPT | Performed by: GENERAL PRACTICE

## 2025-07-28 NOTE — PROGRESS NOTES
Otolaryngology - Head and Neck Surgery Outpatient Established Patient Visit Note        Assessment/Plan:   Problem List Items Addressed This Visit    None  Visit Diagnoses         Codes      Impacted cerumen of left ear    -  Primary H61.22      Dysfunction of both eustachian tubes     H69.93      Retraction of tympanic membrane of both ears     H73.893                  Follow up:  -plan for follow up in clinic as needed, after completion of ordered studies, and in 1-2 months    All of the above findings, impressions, treatment planning and follow up plans were discussed with the patient who indicated understanding.  the patient was instructed to contact or return to clinic sooner if symptoms/signs persist or worsen despite the above management.      Edenilson Collado MD  Otolaryngology - Head and Neck Surgery            History Of Present Illness  Ely Melgar is a 49 y.o. female presenting for follow up of severe left cerumen impaction.    Notes ongoing L>R ear fullness  No otalgia, otorrhea.         Recall  Ely Melgar is a 49 y.o. female presenting for follow up of impacted cerumen and chronic otitis.   Improved right ear hearing, no ongoing otalgia, otorrhea.  Ongoing left ear fullness and muffled hearing.  Some L sided otalgia intermittently.          Recall  Ely Melgar is a 49 y.o. female presenting for cerumen.      Noted to have impaction at .   Attempted debridement with flushing without relief.   Notes worsned ear fullness and some otalgia.   Notes itching/irritation in ears.      Infrequent prior otitis history   B/l hearing loss.   No otorrhea.         Past Medical History  She has a past medical history of HTN (hypertension), Thyroid nodule, and Unspecified abdominal hernia without obstruction or gangrene.    Surgical History  She has no past surgical history on file.     Social History  She reports that she has never smoked. She has never used smokeless tobacco. She reports current alcohol  use. She reports current drug use. Drug: Marijuana.    Family History  Family History[1]     Allergies  Patient has no known allergies.    Review of Systems  ROS: Pertinent positives as noted in HPI.    - CONSTITUTIONAL: Does not report weight loss, fever or chills.    - HEENT:   Ear: Does not report  , vertigo,    , otalgia, otorrhea  Nose: Does not report congestion, rhinorrhea, epistaxis, decreased smell  Throat: Does not report pain, dysphagia, odynophagia  Larynx: Does not report hoarseness,  difficulty breathing, pain with speaking (odynophonia)  Neck: Does not report new masses, pain, swelling  Face: Does not report sinus pain, pressure, swelling, numbness, weakness     - RESPIRATORY: Does not report SOB or cough.    - CV: Does not report palpitations or chest pain.     - GI: Does not report abdominal pain, nausea, vomiting or diarrhea.    - : Does not report dysuria or urinary frequency.    - MSK: Does not report myalgia or joint pain.    - SKIN: Does not report rash or pruritus.    - NEUROLOGICAL: Does not report headache or syncope.    - PSYCHIATRIC: Does not report recent changes in mood. Does not report anxiety or depression.         Physical Exam:     GENERAL:   Alert & Oriented to person, place and time; Normal affect and appearance. Well developed and well nourished. Conversant & cooperative with examination.     HEAD:   Normocephalic, atraumatic. No sinus tenderness to palpation. Normal parotid bilaterally. Normal facial strength.     NEUROLOGIC:   Cranial nerves II-XII grossly intact, gait WNL. Normal mood and affect.    EYES:   Extraocular movements intact. Pupils equal, round, reactive to light and accommodation. No nystagmus, no ptosis. no scleral injection.    EAR:   Normal auricle. No discomfort or TTP with manipulation.   Handheld otoscopic exam showed normal external auditory canals bilaterally. No purulence or EAC inflammation. Sevre impaction on the left.  Debrided with  suction/forceps/loops.  Extra time required.    Right tympanic membrane clear and poorly mobile  with posterior/superior retraction pocket and incudopexy.  Does not resolve with valsalva.    Left tympanic membrane clear and mobile with posterior/superior retraction and incudopexy. Resolve/release with valsalva without evidence of perforation,   or middle ear effusion.     NOSE:   No external deformity. No external nasal lesions, lacerations, or scars. Nasal tip symmetrical with normal nasal valves.   Nasal cavity with essentially midline septum, normal mucosa and turbinates. No lesions, masses, purulence or polyps.     OC/OP:   Mucous membranes moist, no masses, lesions or exudates.   Normal tongue, floor of mouth, teeth, gums, lips. Normal posterior pharyngeal wall.    Normal tonsils without erythema, exudate or obvious calculi     NECK:   No neck masses or thyroid enlargement. Trachea midline. No tenderness to palpation    LYMPHATIC:   No cervical lymphadenopathy.     RESPIRATORY:   Symmetric chest elevation & no retractions. No significant hoarseness. No increased work of breathing.    CV:   No clubbing or cyanosis. No obvious edema    Skin:   No facial rashes, vesicles or lesions.     Extremities:   No gross abnormalities      Clinic Procedure    Binocular microscopy exam  Indication: tympanic membrane(s) could not be visualized adequately with handheld otoscopy.   Location:  bilateral ears  Visualization Instrument: A microscope was used to visualize through a speculum placed in the ear canal(s) to visualize the ear canal, tympanic membranes and to assist in assessment and removal of debris.  Findings:  see physical exam documentation  Patient Status: The patient tolerated the procedure well.   Complications: There were no complications.     Information review:  External sources (notes, imaging, lab results) listed below personally reviewed to aid in medical decision making process.  -  -  -         [1]    Family History  Problem Relation Name Age of Onset    Hypertension Father      Diabetes Father      Hyperlipidemia Father      Hypertension Sister

## 2025-08-05 ENCOUNTER — HOSPITAL ENCOUNTER (OUTPATIENT)
Dept: RADIOLOGY | Facility: CLINIC | Age: 50
Discharge: HOME | End: 2025-08-05
Payer: COMMERCIAL

## 2025-08-05 DIAGNOSIS — H73.891 RETRACTION OF TYMPANIC MEMBRANE OF RIGHT EAR: ICD-10-CM

## 2025-08-05 PROCEDURE — 70480 CT ORBIT/EAR/FOSSA W/O DYE: CPT | Performed by: RADIOLOGY

## 2025-08-05 PROCEDURE — 70480 CT ORBIT/EAR/FOSSA W/O DYE: CPT

## 2025-08-14 LAB — NONINV COLON CA DNA+OCC BLD SCRN STL QL: NEGATIVE

## 2025-08-15 ENCOUNTER — TELEPHONE (OUTPATIENT)
Dept: PRIMARY CARE | Facility: CLINIC | Age: 50
End: 2025-08-15
Payer: COMMERCIAL

## 2025-08-20 ENCOUNTER — OFFICE VISIT (OUTPATIENT)
Dept: PRIMARY CARE | Facility: CLINIC | Age: 50
End: 2025-08-20
Payer: COMMERCIAL

## 2025-08-20 ENCOUNTER — RESULTS FOLLOW-UP (OUTPATIENT)
Dept: PRIMARY CARE | Facility: CLINIC | Age: 50
End: 2025-08-20

## 2025-08-20 VITALS
SYSTOLIC BLOOD PRESSURE: 130 MMHG | DIASTOLIC BLOOD PRESSURE: 80 MMHG | TEMPERATURE: 97.2 F | HEIGHT: 63 IN | WEIGHT: 184 LBS | BODY MASS INDEX: 32.6 KG/M2

## 2025-08-20 DIAGNOSIS — E53.8 VITAMIN B12 DEFICIENCY: ICD-10-CM

## 2025-08-20 DIAGNOSIS — I10 PRIMARY HYPERTENSION: Primary | ICD-10-CM

## 2025-08-20 DIAGNOSIS — E78.00 PURE HYPERCHOLESTEROLEMIA: ICD-10-CM

## 2025-08-20 DIAGNOSIS — E05.90 HYPERTHYROIDISM: ICD-10-CM

## 2025-08-20 DIAGNOSIS — R73.03 PREDIABETES: ICD-10-CM

## 2025-08-20 DIAGNOSIS — E55.9 VITAMIN D DEFICIENCY: ICD-10-CM

## 2025-08-20 DIAGNOSIS — Z12.31 ENCOUNTER FOR SCREENING MAMMOGRAM FOR MALIGNANT NEOPLASM OF BREAST: ICD-10-CM

## 2025-08-20 LAB
25(OH)D3 SERPL-MCNC: 26 NG/ML (ref 30–100)
ANION GAP SERPL CALC-SCNC: 15 MMOL/L (ref 10–20)
BASOPHILS # BLD AUTO: 0.02 X10*3/UL (ref 0.1–1.6)
BASOPHILS NFR BLD AUTO: 0.39 % (ref 0–0.3)
BUN SERPL-MCNC: 17 MG/DL (ref 7–18)
CALCIUM SERPL-MCNC: 9.6 MG/DL (ref 8.5–10.1)
CHLORIDE SERPL-SCNC: 103 MMOL/L (ref 98–107)
CO2 SERPL-SCNC: 26 MMOL/L (ref 21–32)
CREAT SERPL-MCNC: 0.75 MG/DL (ref 0.6–1.1)
EGFRCR SERPLBLD CKD-EPI 2021: >90 ML/MIN/1.73M*2
EOSINOPHIL # BLD AUTO: 0.11 X10*3/UL (ref 0.04–0.5)
EOSINOPHIL NFR BLD AUTO: 2.55 % (ref 0.7–7)
ERYTHROCYTE [DISTWIDTH] IN BLOOD BY AUTOMATED COUNT: 13.2 % (ref 11.5–14.5)
GLUCOSE SERPL-MCNC: 102 MG/DL (ref 74–100)
HBA1C MFR BLD: 5.6 %
HCT VFR BLD AUTO: 40.1 % (ref 36.6–46.6)
HGB BLD-MCNC: 13.64 G/DL (ref 12–15.4)
LYMPHOCYTES # BLD AUTO: 2.03 X10*3/UL (ref 0–6)
LYMPHOCYTES NFR BLD AUTO: 49.19 % (ref 20.5–51.1)
MCH RBC QN AUTO: 31 PG (ref 26–32)
MCHC RBC AUTO-ENTMCNC: 34 G/DL (ref 31–38)
MCV RBC AUTO: 91.2 FL (ref 80–96)
MONOCYTES # BLD AUTO: 0.4 X10*3/UL (ref 1.6–24.9)
MONOCYTES NFR BLD AUTO: 9.64 % (ref 1.7–9.3)
NEUTROPHILS # BLD AUTO: 1.58 X10*3/UL (ref 1.4–6.5)
NEUTROPHILS NFR BLD AUTO: 38.23 % (ref 42.2–75.2)
PLATELET # BLD AUTO: 305.6 X10*3/UL (ref 150–450)
PMV BLD AUTO: 7.55 FL (ref 7.8–11)
POTASSIUM SERPL-SCNC: 3.7 MMOL/L (ref 3.5–5.1)
RBC # BLD AUTO: 4.4 X10*6/UL (ref 3.9–5.3)
SODIUM SERPL-SCNC: 140 MMOL/L (ref 136–145)
TSH SERPL-ACNC: 9.12 MIU/L (ref 0.44–3.98)
WBC # BLD AUTO: 4.12 X10*3/UL (ref 4.5–10.5)

## 2025-08-20 PROCEDURE — 3075F SYST BP GE 130 - 139MM HG: CPT | Performed by: PHYSICIAN ASSISTANT

## 2025-08-20 PROCEDURE — 3079F DIAST BP 80-89 MM HG: CPT | Performed by: PHYSICIAN ASSISTANT

## 2025-08-20 PROCEDURE — 80048 BASIC METABOLIC PNL TOTAL CA: CPT | Performed by: PHYSICIAN ASSISTANT

## 2025-08-20 PROCEDURE — 1036F TOBACCO NON-USER: CPT | Performed by: PHYSICIAN ASSISTANT

## 2025-08-20 PROCEDURE — 99214 OFFICE O/P EST MOD 30 MIN: CPT | Performed by: PHYSICIAN ASSISTANT

## 2025-08-20 PROCEDURE — 3008F BODY MASS INDEX DOCD: CPT | Performed by: PHYSICIAN ASSISTANT

## 2025-08-20 PROCEDURE — 84443 ASSAY THYROID STIM HORMONE: CPT | Performed by: PHYSICIAN ASSISTANT

## 2025-08-20 PROCEDURE — 83036 HEMOGLOBIN GLYCOSYLATED A1C: CPT | Performed by: PHYSICIAN ASSISTANT

## 2025-08-20 PROCEDURE — 82306 VITAMIN D 25 HYDROXY: CPT | Performed by: PHYSICIAN ASSISTANT

## 2025-08-20 PROCEDURE — 85025 COMPLETE CBC W/AUTO DIFF WBC: CPT | Performed by: PHYSICIAN ASSISTANT

## 2025-08-20 ASSESSMENT — ENCOUNTER SYMPTOMS
CHILLS: 0
NUMBNESS: 0
JOINT SWELLING: 0
FREQUENCY: 0
HEADACHES: 0
WHEEZING: 0
FATIGUE: 0
COUGH: 0
ARTHRALGIAS: 0
ANAL BLEEDING: 0
SLEEP DISTURBANCE: 0
COLOR CHANGE: 0
HEMATURIA: 0
FACIAL SWELLING: 0
EYE PAIN: 0
SHORTNESS OF BREATH: 0
TREMORS: 0
DIARRHEA: 0
NERVOUS/ANXIOUS: 0
CHOKING: 0
DIZZINESS: 0
ABDOMINAL DISTENTION: 0
APPETITE CHANGE: 0
NAUSEA: 0
VOMITING: 0
POLYPHAGIA: 0
DIFFICULTY URINATING: 0
CHEST TIGHTNESS: 0
MYALGIAS: 0
FEVER: 0
EYE DISCHARGE: 0
WEAKNESS: 0
PALPITATIONS: 0
CONSTIPATION: 0
CONFUSION: 0
POLYDIPSIA: 0
ABDOMINAL PAIN: 0
SORE THROAT: 0

## 2025-08-20 ASSESSMENT — COLUMBIA-SUICIDE SEVERITY RATING SCALE - C-SSRS
1. IN THE PAST MONTH, HAVE YOU WISHED YOU WERE DEAD OR WISHED YOU COULD GO TO SLEEP AND NOT WAKE UP?: NO
2. HAVE YOU ACTUALLY HAD ANY THOUGHTS OF KILLING YOURSELF?: NO
6. HAVE YOU EVER DONE ANYTHING, STARTED TO DO ANYTHING, OR PREPARED TO DO ANYTHING TO END YOUR LIFE?: NO

## 2025-08-20 ASSESSMENT — PAIN SCALES - GENERAL: PAINLEVEL_OUTOF10: 4

## 2025-08-21 LAB
T3FREE SERPL-MCNC: 3.6 PG/ML (ref 2.3–4.2)
T4 FREE SERPL-MCNC: 1.1 NG/DL (ref 0.8–1.8)

## 2025-08-25 ENCOUNTER — APPOINTMENT (OUTPATIENT)
Dept: PRIMARY CARE | Facility: CLINIC | Age: 50
End: 2025-08-25
Payer: COMMERCIAL

## 2025-08-25 ENCOUNTER — APPOINTMENT (OUTPATIENT)
Dept: ENDOCRINOLOGY | Facility: CLINIC | Age: 50
End: 2025-08-25
Payer: COMMERCIAL

## 2025-08-25 VITALS
DIASTOLIC BLOOD PRESSURE: 91 MMHG | SYSTOLIC BLOOD PRESSURE: 144 MMHG | WEIGHT: 183 LBS | BODY MASS INDEX: 32.43 KG/M2 | HEART RATE: 72 BPM | HEIGHT: 63 IN

## 2025-08-25 DIAGNOSIS — E05.90 HYPERTHYROIDISM: ICD-10-CM

## 2025-08-25 DIAGNOSIS — E05.00 GRAVES DISEASE: Primary | ICD-10-CM

## 2025-08-25 DIAGNOSIS — E55.9 VITAMIN D DEFICIENCY: ICD-10-CM

## 2025-08-25 PROCEDURE — 1036F TOBACCO NON-USER: CPT | Performed by: STUDENT IN AN ORGANIZED HEALTH CARE EDUCATION/TRAINING PROGRAM

## 2025-08-25 PROCEDURE — 3080F DIAST BP >= 90 MM HG: CPT | Performed by: STUDENT IN AN ORGANIZED HEALTH CARE EDUCATION/TRAINING PROGRAM

## 2025-08-25 PROCEDURE — 3077F SYST BP >= 140 MM HG: CPT | Performed by: STUDENT IN AN ORGANIZED HEALTH CARE EDUCATION/TRAINING PROGRAM

## 2025-08-25 PROCEDURE — 99214 OFFICE O/P EST MOD 30 MIN: CPT | Performed by: STUDENT IN AN ORGANIZED HEALTH CARE EDUCATION/TRAINING PROGRAM

## 2025-08-25 PROCEDURE — 3008F BODY MASS INDEX DOCD: CPT | Performed by: STUDENT IN AN ORGANIZED HEALTH CARE EDUCATION/TRAINING PROGRAM

## 2025-08-25 RX ORDER — METHIMAZOLE 5 MG/1
2.5 TABLET ORAL DAILY
Qty: 45 TABLET | Refills: 3 | Status: SHIPPED | OUTPATIENT
Start: 2025-08-25 | End: 2026-08-25

## 2025-09-24 ENCOUNTER — APPOINTMENT (OUTPATIENT)
Dept: OPHTHALMOLOGY | Facility: CLINIC | Age: 50
End: 2025-09-24
Payer: COMMERCIAL

## 2025-12-03 ENCOUNTER — APPOINTMENT (OUTPATIENT)
Dept: RADIOLOGY | Facility: CLINIC | Age: 50
End: 2025-12-03
Payer: COMMERCIAL

## 2025-12-22 ENCOUNTER — APPOINTMENT (OUTPATIENT)
Dept: PRIMARY CARE | Facility: CLINIC | Age: 50
End: 2025-12-22
Payer: COMMERCIAL

## 2026-03-30 ENCOUNTER — APPOINTMENT (OUTPATIENT)
Dept: ENDOCRINOLOGY | Facility: CLINIC | Age: 51
End: 2026-03-30
Payer: COMMERCIAL